# Patient Record
Sex: FEMALE | Race: WHITE | Employment: STUDENT | URBAN - METROPOLITAN AREA
[De-identification: names, ages, dates, MRNs, and addresses within clinical notes are randomized per-mention and may not be internally consistent; named-entity substitution may affect disease eponyms.]

---

## 2017-02-20 ENCOUNTER — HOSPITAL ENCOUNTER (EMERGENCY)
Age: 22
Discharge: HOME | End: 2017-02-20
Attending: EMERGENCY MEDICINE
Payer: COMMERCIAL

## 2017-02-20 VITALS
DIASTOLIC BLOOD PRESSURE: 111 MMHG | WEIGHT: 240 LBS | BODY MASS INDEX: 40.97 KG/M2 | OXYGEN SATURATION: 96 % | SYSTOLIC BLOOD PRESSURE: 166 MMHG | TEMPERATURE: 98.6 F | HEART RATE: 125 BPM | RESPIRATION RATE: 20 BRPM | HEIGHT: 64 IN

## 2017-02-20 DIAGNOSIS — J10.1 INFLUENZA A: Primary | ICD-10-CM

## 2017-02-20 LAB
RAPID INFLUENZA  B AGN: NEGATIVE
RAPID INFLUENZA A AGN: POSITIVE

## 2017-02-20 PROCEDURE — 99284 EMERGENCY DEPT VISIT MOD MDM: CPT

## 2017-02-20 PROCEDURE — 86403 PARTICLE AGGLUT ANTBDY SCRN: CPT

## 2017-02-20 PROCEDURE — 6360000002 HC RX W HCPCS: Performed by: EMERGENCY MEDICINE

## 2017-02-20 PROCEDURE — 94640 AIRWAY INHALATION TREATMENT: CPT

## 2017-02-20 RX ORDER — PREDNISONE 10 MG/1
60 TABLET ORAL DAILY
Qty: 30 TABLET | Refills: 0 | Status: SHIPPED | OUTPATIENT
Start: 2017-02-20 | End: 2017-02-25

## 2017-02-20 RX ORDER — OSELTAMIVIR PHOSPHATE 75 MG/1
75 CAPSULE ORAL 2 TIMES DAILY
Qty: 10 CAPSULE | Refills: 0 | Status: SHIPPED | OUTPATIENT
Start: 2017-02-20 | End: 2017-02-25

## 2017-02-20 RX ORDER — ALBUTEROL SULFATE 90 UG/1
AEROSOL, METERED RESPIRATORY (INHALATION)
Qty: 1 INHALER | Refills: 1 | Status: SHIPPED | OUTPATIENT
Start: 2017-02-20

## 2017-02-20 RX ORDER — ESTRADIOL AND NORETHINDRONE ACETATE .5; .1 MG/1; MG/1
1 TABLET ORAL DAILY
COMMUNITY

## 2017-02-20 RX ADMIN — IPRATROPIUM BROMIDE 0.5 MG: 0.5 SOLUTION RESPIRATORY (INHALATION) at 15:46

## 2017-02-20 RX ADMIN — ALBUTEROL SULFATE 5 MG: 2.5 SOLUTION RESPIRATORY (INHALATION) at 15:46

## 2017-02-20 ASSESSMENT — ENCOUNTER SYMPTOMS
EYE PAIN: 0
FACIAL SWELLING: 0
DIARRHEA: 0
COLOR CHANGE: 0
VOICE CHANGE: 0
RECTAL PAIN: 0
COUGH: 1
RHINORRHEA: 0
SINUS PRESSURE: 0
APNEA: 0
BACK PAIN: 0
ABDOMINAL PAIN: 0
ABDOMINAL DISTENTION: 0
TROUBLE SWALLOWING: 0
CHEST TIGHTNESS: 1
SORE THROAT: 0
EYE DISCHARGE: 0
VOMITING: 0
CHOKING: 0
WHEEZING: 1
CONSTIPATION: 0
STRIDOR: 0
BLOOD IN STOOL: 0
EYE ITCHING: 0
SHORTNESS OF BREATH: 0
ANAL BLEEDING: 0
PHOTOPHOBIA: 0
NAUSEA: 0
EYE REDNESS: 0

## 2017-09-06 ENCOUNTER — HOSPITAL ENCOUNTER (EMERGENCY)
Age: 22
Discharge: HOME | End: 2017-09-06
Attending: EMERGENCY MEDICINE
Payer: COMMERCIAL

## 2017-09-06 VITALS
BODY MASS INDEX: 40.97 KG/M2 | HEART RATE: 64 BPM | WEIGHT: 240 LBS | SYSTOLIC BLOOD PRESSURE: 164 MMHG | RESPIRATION RATE: 16 BRPM | HEIGHT: 64 IN | TEMPERATURE: 98.2 F | OXYGEN SATURATION: 98 % | DIASTOLIC BLOOD PRESSURE: 72 MMHG

## 2017-09-06 DIAGNOSIS — R42 DIZZINESS: Primary | ICD-10-CM

## 2017-09-06 LAB
BILIRUBIN URINE: NEGATIVE
BLOOD, URINE: NEGATIVE
CLARITY: CLEAR
COLOR: YELLOW
GLUCOSE URINE: NEGATIVE MG/DL
HCG(URINE) PREGNANCY TEST: NEGATIVE
KETONES, URINE: NORMAL MG/DL
LEUKOCYTE ESTERASE, URINE: NEGATIVE
NITRITE, URINE: NEGATIVE
PH UA: 7 (ref 5–9)
PROTEIN UA: NEGATIVE MG/DL
SPECIFIC GRAVITY UA: 1.02 (ref 1–1.03)
URINE REFLEX TO CULTURE: NORMAL
UROBILINOGEN, URINE: 0.2 E.U./DL

## 2017-09-06 PROCEDURE — 81003 URINALYSIS AUTO W/O SCOPE: CPT

## 2017-09-06 PROCEDURE — 99283 EMERGENCY DEPT VISIT LOW MDM: CPT

## 2017-09-06 PROCEDURE — 84703 CHORIONIC GONADOTROPIN ASSAY: CPT

## 2017-09-06 ASSESSMENT — ENCOUNTER SYMPTOMS
ABDOMINAL PAIN: 0
WHEEZING: 0
DIARRHEA: 0
NAUSEA: 0
TROUBLE SWALLOWING: 0
COUGH: 0
BLOOD IN STOOL: 0
EYE PAIN: 0
VOMITING: 0
CHEST TIGHTNESS: 0
RHINORRHEA: 0
BACK PAIN: 0
SHORTNESS OF BREATH: 0

## 2018-06-11 ENCOUNTER — OFFICE VISIT (OUTPATIENT)
Dept: UROLOGY | Facility: CLINIC | Age: 23
End: 2018-06-11
Payer: COMMERCIAL

## 2018-06-11 ENCOUNTER — HOSPITAL ENCOUNTER (OUTPATIENT)
Dept: RADIOLOGY | Facility: HOSPITAL | Age: 23
Discharge: HOME | End: 2018-06-11
Attending: NURSE PRACTITIONER
Payer: COMMERCIAL

## 2018-06-11 VITALS
HEIGHT: 65 IN | DIASTOLIC BLOOD PRESSURE: 86 MMHG | HEART RATE: 72 BPM | BODY MASS INDEX: 43.89 KG/M2 | SYSTOLIC BLOOD PRESSURE: 140 MMHG | WEIGHT: 263.44 LBS

## 2018-06-11 DIAGNOSIS — R10.9 FLANK PAIN: ICD-10-CM

## 2018-06-11 DIAGNOSIS — R10.9 ACUTE LEFT FLANK PAIN: Primary | ICD-10-CM

## 2018-06-11 DIAGNOSIS — R11.0 NAUSEA: ICD-10-CM

## 2018-06-11 DIAGNOSIS — Z87.442 PERSONAL HISTORY OF KIDNEY STONES: ICD-10-CM

## 2018-06-11 DIAGNOSIS — R10.9 ACUTE LEFT FLANK PAIN: ICD-10-CM

## 2018-06-11 LAB
BILIRUB SERPL-MCNC: NORMAL MG/DL
BLOOD URINE, POC: NORMAL
COLOR, POC UA: YELLOW
GLUCOSE UR QL STRIP: NORMAL
KETONES UR QL STRIP: NORMAL
LEUKOCYTE ESTERASE URINE, POC: NORMAL
NITRITE, POC UA: NORMAL
PH, POC UA: 5
PROTEIN, POC: NORMAL
SPECIFIC GRAVITY, POC UA: 1.02
UROBILINOGEN, POC UA: NORMAL

## 2018-06-11 PROCEDURE — 74018 RADEX ABDOMEN 1 VIEW: CPT | Mod: TC

## 2018-06-11 PROCEDURE — 74018 RADEX ABDOMEN 1 VIEW: CPT | Mod: 26,,, | Performed by: RADIOLOGY

## 2018-06-11 PROCEDURE — 81002 URINALYSIS NONAUTO W/O SCOPE: CPT | Mod: S$GLB,,, | Performed by: NURSE PRACTITIONER

## 2018-06-11 PROCEDURE — 99204 OFFICE O/P NEW MOD 45 MIN: CPT | Mod: 25,S$GLB,, | Performed by: NURSE PRACTITIONER

## 2018-06-11 PROCEDURE — 87086 URINE CULTURE/COLONY COUNT: CPT

## 2018-06-11 PROCEDURE — 99999 PR PBB SHADOW E&M-NEW PATIENT-LVL IV: CPT | Mod: PBBFAC,,, | Performed by: NURSE PRACTITIONER

## 2018-06-11 PROCEDURE — 3008F BODY MASS INDEX DOCD: CPT | Mod: CPTII,S$GLB,, | Performed by: NURSE PRACTITIONER

## 2018-06-11 RX ORDER — LEVONORGESTREL AND ETHINYL ESTRADIOL 0.15-0.03
1 KIT ORAL DAILY
COMMUNITY

## 2018-06-11 RX ORDER — KETOROLAC TROMETHAMINE 10 MG/1
10 TABLET, FILM COATED ORAL EVERY 6 HOURS PRN
Qty: 22 TABLET | Refills: 0 | Status: SHIPPED | OUTPATIENT
Start: 2018-06-11 | End: 2019-01-03

## 2018-06-11 RX ORDER — ONDANSETRON 8 MG/1
8 TABLET, ORALLY DISINTEGRATING ORAL EVERY 8 HOURS PRN
Qty: 22 TABLET | Refills: 0 | Status: SHIPPED | OUTPATIENT
Start: 2018-06-11 | End: 2019-01-03

## 2018-06-11 NOTE — PATIENT INSTRUCTIONS
Treating Kidney Stones: Ureteroscopic Stone Removal    Ureteroscopic stone removal may be done before, after, or instead of other treatments. If you need this procedure, your healthcare provider will discuss its risks and possible complications. You will be told how to prepare. And you will be told about anesthesia that will keep you pain-free during treatment.     A ureteroscope lets your doctor see your stone before removing it.   Removing the stone through the ureter  Ureteroscopic stone removal extracts a small stone in your ureter without an incision. Your doctor places a viewing tube (ureteroscope) in your ureter. A wire basket inserted through the tube removes the stone. Sometimes, a laser or a mechanical device is used to break up the stone. A soft tube may be left in your ureter briefly to drain urine.     The stone may be fragmented. The stone is then withdrawn or passed.   Your recovery  This is an outpatient or overnight procedure. For a few days after surgery, you may feel some pain when you urinate. Or you may need to urinate more often, or have bloody urine. You may have a ureteral stent. This is a soft tube that prevents blockage from swelling after the procedure. The stent is removed when the swelling goes down, often within days. Follow up as instructed to check for any new stones.  When to call your healthcare provider  Call your healthcare provider right away if:  · You have sudden pain or flank pain  · You have a fever over 100.4°F (38°C)  · You have nausea that lasts for days  · You have heavy bleeding when you urinate  · You have heavy bleeding through your drainage tube  · You have swelling or redness around your incision   Date Last Reviewed: 2/1/2017 © 2000-2017 [employer]. [address] [place] [address], [place] [address]. All rights reserved. This information is not intended as a substitute for professional medical care. Always follow your healthcare professional's  instructions.        Kidney Stone, Passed    A kidney stone (nephrolithiasis) starts as tiny crystals that form inside the kidney where urine is made. Most kidney stones enlarge to about 1/8 to 1/4 inch in size before leaving the kidney and moving toward the bladder. The sharp, cramping pain and nausea/vomiting you had was the stone moving through the ureter (the narrow tube joining the kidney to the bladder). Once the stone reaches your bladder, the pain stops. Pain may start again as the stone passes through the bladder and out through the urethra. There are 4 types of kidney stones. Eighty percent are calcium stones--mostly calcium oxalate but also some with calcium phosphate. The other 3 types include uric acid stones, struvite stones (from a preceding infection), and rarely, cystine stones.  Home care  The following guidelines will help you care for yourself at home:  · Drink plenty of fluids. This increases urine flow and reduces the chance that a new stone will form. Healthy adults (no heart/liver/kidney disease) who have had a kidney stone should drink 12, 8-ounce glasses of fluids per day. Most of this should be water. The goal is to produce 1.5 to 2 quarts of almost colorless urine per 24 hours.  · Unless another NSAID (non-steroidal anti-inflammatory drug) was given, you may take ibuprofen or naproxen in addition to any narcotic pain medicine your healthcare provider prescribes. If you have chronic liver or kidney disease or ever had a stomach ulcer or GI bleeding, talk with your healthcare provider before using these medicines.  · Collecting the stone: If you were given a strainer, urinate into a jar then pour the urine through the strainer and into the toilet. Keep doing this for 24 hours after your pain stops. Save any stone that you find in the strainer and bring it to your healthcare provider for analysis. If you do not collect a stone, a 24-hour urine specimen can be done at a later time by your  healthcare provider to figure out the cause of your stone.  Prevention  Each year, there is a chance that a new stone will form (50% chance over the next 5 to 7 years). The risk is highest if you have a family history of kidney stones or have certain chronic illnesses such as hypertension, obesity, or diabetes. However, there are lifestyle and dietary changes that you can make to reduce the risk of getting another kidney stone.  Most kidney stones are made of calcium. The following advice can help you prevent calcium stones. If you dont know the type of stone you have, follow this advice until the healthcare provider determines the cause of your stone.  Things that help:  · The most important thing you can do is to drink plenty of fluids each day, as described above.  · Certain foods, such as wheat, rice, rye, barley, and beans, contain phytate. Phytate is a compound that may lower the risk of recurrence of any type of stone.  · Eat more fruits and vegetables, especially those high in potassium.  · Eat foods high in natural citrate like fruit and fruit juices (using low sugar).  · Low calcium contributes to calcium type kidney stones. Eat a normal calcium diet and talk with your healthcare provider if you are taking calcium supplements. It may be detrimental to lower your calcium intake. New research shows that eating calcium-rich and oxalate-rich foods together lowers your risk of stones. This is because eating these foods together binds the minerals in the stomach and intestines before they can reach the kidneys.  · Limit salt intake to 2 grams (1 teaspoon) per day. Use limited amounts when cooking, and dont add salt at the table. Processed and canned foods are usually high in salt.  · Spinach, rhubarb, peanuts, cashews, almonds, grapefruit, and grapefruit juice are all high oxalate foods and should be reduced, or eaten with calcium-rich foods. These foods include dairy, dark leafy greens, soy products,  calcium-enriched foods, and others.  · Reduce the amount of animal meat and high protein foods in your diet. This may lower your risk of uric acid stones.  · Avoid excess sugar (sucrose) and fructose (sweetener in many soft drinks) in your diet.  · If you take vitamin C as a supplement, don't take more than 1,000 milligrams (mg) per day.  · A dietitian or your healthcare provider can give you ideas on how to change your diet to prevent more kidney stones.  Follow-up care  Follow up with your healthcare provider, or as advised. Even if you don't collect the kidney stone, it is possible to analyze a 24-hour urine collection for the cause of this stone. Discuss this with your healthcare provider.  If you had an X-ray, CT scan, or other diagnostic test, you will be told of any findings that may affect your care.  Call 911  Call 911 if you have any of these:  · Weakness, dizziness, or fainting  When to seek medical advice  Call your healthcare provider if any of the these occur:  · Severe pain that returns and not relieved by pain medicines  · Repeated vomiting or unable to keep down fluids  · Fever of 101.4ºF (38ºC) or higher, or as directed by your healthcare provider  · Blood clots in urine  · Foul smelling or cloudy urine  · Unable to pass urine for 8 hours or increasing bladder pressure  Date Last Reviewed: 10/1/2016  © 3486-8898 [employer]. [address] [place] [address], [place] [address]. All rights reserved. This information is not intended as a substitute for professional medical care. Always follow your healthcare professional's instructions.

## 2018-06-11 NOTE — PROGRESS NOTES
Subjective:       Patient ID: Ela Downs is a 22 y.o. female.    Chief Complaint: Other (left side pain. rates 5 on pain scale. hx of 8mm stone lithrotrypsy. stent for 2 weeks back in march. states pain feel similar to the pain in march)      HPI: Ela Downs is a 22 y.o. female who presents today for evaluation and management of possible kidney stones. She presents with her mother. This is her initial visit to the clinic.     The patient reports left upper abdominal pain. She describes it as dull with sharp stabbing at times. She rates it a 4/10- 9/10 at times. She states the pain is constant but at times increases. The pain has occurred for the last 3 days. She reports ibuprofen has helped to ease the pain. Nothing worsens the pain. Reports nausea. She denies f/c. She states that she had an 8 mm kidney stone in 3/2018 and was treated with a ureteroscopy and lithotripsy in [place]. The pain she is experiencing today is similar to the kidney stone pain before.     She reports she is urinating less. Denies dysuria and hematuria. The patient reports a good urinary stream and complete bladder emptying.    Stone Hx:  Ever seen a urologist before? yes  Previous stone episode? yes  Pass the stone? No   How did they treat it if not? Ureteroscopy and lithrotripsy  Type of stone? calcium  Family hx of stones? yes  Fluids? Water 2 liters daily  Occupation? Student     Review of patient's allergies indicates:  No Known Allergies    Current Outpatient Prescriptions   Medication Sig Dispense Refill    levonorgestrel-ethinyl estradiol (SEASONALE) 0.15 mg-30 mcg per tablet Take 1 tablet by mouth once daily.      ketorolac (TORADOL) 10 mg tablet Take 1 tablet (10 mg total) by mouth every 6 (six) hours as needed for Pain. 22 tablet 0    ondansetron (ZOFRAN-ODT) 8 MG TbDL Take 1 tablet (8 mg total) by mouth every 8 (eight) hours as needed. 22 tablet 0     No current facility-administered medications for this visit.         Past Medical History:   Diagnosis Date    Kidney stone        Past Surgical History:   Procedure Laterality Date    KIDNEY STONE SURGERY      LITHOTRIPSY         Family History   Problem Relation Age of Onset    Diabetes Paternal Grandmother     Heart disease Paternal Grandmother     Diabetes Paternal Grandfather     Diabetes Maternal Grandmother     Heart disease Maternal Grandmother     Diabetes Maternal Grandfather     Heart disease Maternal Grandfather          Review of Systems     Review of Systems   Constitutional: Negative for chills, fatigue and fever.   HENT: Negative for facial swelling.    Eyes: Negative for discharge.   Respiratory: Negative for chest tightness and shortness of breath.    Cardiovascular: Negative for leg swelling.   Gastrointestinal: Positive for abdominal pain. Negative for constipation, nausea and vomiting.   Genitourinary: Positive for decreased urine volume and flank pain. Negative for difficulty urinating, dysuria, enuresis, frequency, hematuria and urgency.   Musculoskeletal: Negative for gait problem.   Skin: Negative for rash.   Allergic/Immunologic: Negative for immunocompromised state.   Neurological: Negative for facial asymmetry.   Psychiatric/Behavioral: Negative for agitation and confusion.         Objective:     Vitals:    06/11/18 1108   BP: (!) 140/86   Pulse: 72     Physical Exam     Physical Exam   Nursing note and vitals reviewed.  Constitutional: She is oriented to person, place, and time. She appears well-developed and well-nourished.   HENT:   Head: Normocephalic.   Eyes: Conjunctivae are normal.   Neck: Neck supple.   Cardiovascular: Normal rate.    Pulmonary/Chest: Effort normal and breath sounds normal.   Abdominal: Soft. Bowel sounds are normal. She exhibits no distension and no mass. There is no tenderness. There is no rebound and no guarding.   NO CVA TENDERNESS   Genitourinary:   Genitourinary Comments: UA today showed + leuk and blood.     Musculoskeletal: Normal range of motion.   Neurological: She is alert and oriented to person, place, and time.   Skin: Skin is warm and dry.     Psychiatric: She has a normal mood and affect. Her behavior is normal.              Assessment:       1. Acute left flank pain    2. Personal history of kidney stones    3. Flank pain    4. Nausea        Plan:     Ela was seen today for other.    Diagnoses and all orders for this visit:    Acute left flank pain  -     POCT urine dipstick without microscope  -     X-Ray Abdomen AP 1 View; Future  -     CT Renal Stone Study ABD Pelvis WO; Future  -     Urine culture  -     ketorolac (TORADOL) 10 mg tablet; Take 1 tablet (10 mg total) by mouth every 6 (six) hours as needed for Pain.    Personal history of kidney stones  -     POCT urine dipstick without microscope  -     X-Ray Abdomen AP 1 View; Future  -     CT Renal Stone Study ABD Pelvis WO; Future  -     Urine culture  -     ketorolac (TORADOL) 10 mg tablet; Take 1 tablet (10 mg total) by mouth every 6 (six) hours as needed for Pain.    Flank pain  -     X-Ray Abdomen AP 1 View; Future  -     Urine culture  -     ketorolac (TORADOL) 10 mg tablet; Take 1 tablet (10 mg total) by mouth every 6 (six) hours as needed for Pain.    Nausea  -     ondansetron (ZOFRAN-ODT) 8 MG TbDL; Take 1 tablet (8 mg total) by mouth every 8 (eight) hours as needed.      Discussed kidney stones.  Diet modifications; educational materials.  Continue good water intake of at least 2.5 liters daily;   Strain all urine  KUB today.  CTRSS  Discussed 24 hour urine collection in the future  Discussed side effects, indications, and MOA for toradol and zofran. Prescription sent to the pharmacy. Pt verbalized understanding.    Get prompt medical attention if any of the following occur:  · Severe pain that returns and not relieved by pain medicines  · Repeated vomiting or unable to keep down fluids  · Weakness, dizziness or fainting  · Fever of 101.4ºF  (38ºC) or higher, or as directed by your healthcare provider  · Blood clots in urine  · Foul smelling or cloudy urine  · Unable to pass urine for 8 hours or increasing bladder pressure  ·   I encouraged her or any of her family members to call or email me with questions and/or concerns.  .

## 2018-06-12 ENCOUNTER — HOSPITAL ENCOUNTER (OUTPATIENT)
Dept: RADIOLOGY | Facility: HOSPITAL | Age: 23
Discharge: HOME | End: 2018-06-12
Attending: NURSE PRACTITIONER
Payer: COMMERCIAL

## 2018-06-12 DIAGNOSIS — Z87.442 PERSONAL HISTORY OF KIDNEY STONES: ICD-10-CM

## 2018-06-12 DIAGNOSIS — R10.9 ACUTE LEFT FLANK PAIN: ICD-10-CM

## 2018-06-12 LAB
BACTERIA UR CULT: NORMAL
BACTERIA UR CULT: NORMAL

## 2018-06-12 PROCEDURE — 74176 CT ABD & PELVIS W/O CONTRAST: CPT | Mod: 26,,, | Performed by: RADIOLOGY

## 2018-06-12 PROCEDURE — 74176 CT ABD & PELVIS W/O CONTRAST: CPT | Mod: TC

## 2018-06-13 DIAGNOSIS — K65.4 SCLEROSING MESENTERITIS: Primary | ICD-10-CM

## 2018-06-15 ENCOUNTER — OFFICE VISIT (OUTPATIENT)
Dept: GASTROENTEROLOGY | Facility: CLINIC | Age: 23
End: 2018-06-15
Payer: COMMERCIAL

## 2018-06-15 VITALS
WEIGHT: 265.44 LBS | DIASTOLIC BLOOD PRESSURE: 89 MMHG | SYSTOLIC BLOOD PRESSURE: 143 MMHG | HEIGHT: 65 IN | HEART RATE: 93 BPM | BODY MASS INDEX: 44.22 KG/M2

## 2018-06-15 DIAGNOSIS — R10.12 LUQ ABDOMINAL PAIN: Primary | ICD-10-CM

## 2018-06-15 PROCEDURE — 99204 OFFICE O/P NEW MOD 45 MIN: CPT | Mod: S$GLB,,, | Performed by: INTERNAL MEDICINE

## 2018-06-15 PROCEDURE — 3008F BODY MASS INDEX DOCD: CPT | Mod: CPTII,S$GLB,, | Performed by: INTERNAL MEDICINE

## 2018-06-15 PROCEDURE — 99999 PR PBB SHADOW E&M-EST. PATIENT-LVL III: CPT | Mod: PBBFAC,,, | Performed by: INTERNAL MEDICINE

## 2018-06-15 NOTE — PROGRESS NOTES
REASON FOR VISIT:  Left upper quadrant abdominal pain.    HISTORY OF PRESENT ILLNESS:  Ms. Ela Downs is a 22-year-old with history   of kidney stones diagnosed in [place] who presented with a similar episode less   than a week ago and on CT scan, no stones were found; however, there was some   suspicion of possible chronic mesenteritis, although upon review of the CT, it   does not look like classic mesenteritis.  In any case, her pain has improved,   denies any further pain, just dull aching in that region.  She denies any   history of trauma or radiation.  She did undergo removal of ureteral stone with   ureteral stent placement in March 2018.  Today, we discussed about proceeding   with a CT scan with oral and IV contrast to be done sometime in October when she   comes down to [place] from [place] for a break.  This will be to reassess her   mesentery to evaluate for chronic mesenteritis.  I did discuss with her and her   mom that the likelihood of chronic mesenteritis is very small, especially with   this being a recommendation and their age group not being very likely to be   affected.    PAST MEDICAL, SURGICAL, SOCIAL AND FAMILY HISTORY:  Reviewed.    MEDICATIONS AND ALLERGIES:  Reviewed.    REVIEW OF SYSTEMS:  CONSTITUTIONAL:  No fever.  No chills.  No weight loss.  Appetite is normal.  EYES:  No visual changes.  No red eyes.  ENT:  No odynophagia or hoarseness of voice.  CARDIOVASCULAR:  No angina or palpitations.  RESPIRATORY:  No shortness of breath or wheezing.  GENITOURINARY:  No dysuria or frequency.  MUSCULOSKELETAL:  No myalgia or arthralgia.  SKIN:  No pruritus or eczema.  NEUROLOGIC:  No headache.  No seizures.  PSYCHIATRIC:  No anxiety or depression.  GASTROINTESTINAL:  See HPI.    PHYSICAL EXAMINATION:  VITAL SIGNS:  See EPIC.  GENERAL:  Awake, alert and oriented x3, in no acute distress.  NECK:  Supple.  No carotid bruit.  No cervical adenopathy.  ABDOMEN:  Obese, soft and nondistended.   Mild tenderness to deep palpation on   the left side.  No guarding or rigidity.  Bowel sounds are normal.  No abdominal   bruits heard.  EYES:  Conjunctivae are anicteric.  ENT:  Oropharynx, mucosa moist.  CARDIOVASCULAR:  S1 and S2 normal.  RESPIRATORY:  Bilateral air entry equal.  SKIN:  No palmar erythema or spider angiomata.  NEUROLOGIC:  No asterixis.  PSYCHIATRY:  Affect is appropriate.  LOWER EXTREMITY:  No pedal edema.    IMPRESSION:  Recent episode of left quadrant abdominal pain with CT scan not   classic for chronic mesenteritis, but there was mention of some lymph nodes ____   the mesentery.    RECOMMENDATIONS:  We will repeat a CT scan in October 2018, when Ms. Downs   returns to [place] with p.o. and IV contrast to reevaluate.  Until then, the   patient will continue to monitor her symptoms and report if her symptoms   worsen.      ACT/IN  dd: 06/15/2018 15:50:43 (CDT)  td: 06/16/2018 05:32:26 (CDT)  Doc ID   #[id number]  Job ID #[id number]    CC:

## 2018-06-15 NOTE — LETTER
Teresa 15, 2018      Shayla Krause, NP  [address] [facility] [address]           [facility] - Gastroenterology  [address]  [place] [address]  Phone: [phone]  Fax: [phone]          Patient: Ela Downs   MR Number: [id number]   YOB: 1995   Date of Visit: 6/15/2018       Dear Shayla Krause:    Thank you for referring Ela Downs to me for evaluation. Attached you will find relevant portions of my assessment and plan of care.    If you have questions, please do not hesitate to call me. I look forward to following Ela Downs along with you.    Sincerely,    Kendall Perez MD    Enclosure  CC:  No Recipients    If you would like to receive this communication electronically, please contact [email or web address] or [phone] to request more information on [employer] access.    For providers and/or their staff who would like to refer a patient to [facility], please contact us through our one-stop-shop provider referral line, [facility], at [phone].    If you feel you have received this communication in error or would no longer like to receive these types of communications, please e-mail [email or web address]

## 2018-12-12 ENCOUNTER — OFFICE VISIT (OUTPATIENT)
Dept: FAMILY MEDICINE CLINIC | Age: 23
End: 2018-12-12
Payer: COMMERCIAL

## 2018-12-12 VITALS
SYSTOLIC BLOOD PRESSURE: 126 MMHG | HEIGHT: 64 IN | BODY MASS INDEX: 43.36 KG/M2 | TEMPERATURE: 97.1 F | DIASTOLIC BLOOD PRESSURE: 80 MMHG | HEART RATE: 90 BPM | RESPIRATION RATE: 98 BRPM | WEIGHT: 254 LBS

## 2018-12-12 DIAGNOSIS — J20.9 ACUTE BRONCHITIS, UNSPECIFIED ORGANISM: Primary | ICD-10-CM

## 2018-12-12 PROCEDURE — 99203 OFFICE O/P NEW LOW 30 MIN: CPT | Performed by: NURSE PRACTITIONER

## 2018-12-12 PROCEDURE — G8427 DOCREV CUR MEDS BY ELIG CLIN: HCPCS | Performed by: NURSE PRACTITIONER

## 2018-12-12 PROCEDURE — G8417 CALC BMI ABV UP PARAM F/U: HCPCS | Performed by: NURSE PRACTITIONER

## 2018-12-12 PROCEDURE — G8484 FLU IMMUNIZE NO ADMIN: HCPCS | Performed by: NURSE PRACTITIONER

## 2018-12-12 PROCEDURE — 1036F TOBACCO NON-USER: CPT | Performed by: NURSE PRACTITIONER

## 2018-12-12 RX ORDER — KETOROLAC TROMETHAMINE 10 MG/1
10 TABLET, FILM COATED ORAL
COMMUNITY
Start: 2018-06-11

## 2018-12-12 RX ORDER — BENZONATATE 100 MG/1
100 CAPSULE ORAL 3 TIMES DAILY PRN
Qty: 21 CAPSULE | Refills: 0 | Status: SHIPPED | OUTPATIENT
Start: 2018-12-12 | End: 2018-12-19

## 2018-12-12 RX ORDER — ALPRAZOLAM 0.25 MG/1
TABLET ORAL
Refills: 0 | COMMUNITY
Start: 2018-12-02

## 2018-12-12 RX ORDER — METHYLPREDNISOLONE 4 MG/1
TABLET ORAL
Qty: 1 KIT | Refills: 0 | Status: SHIPPED | OUTPATIENT
Start: 2018-12-12

## 2018-12-12 RX ORDER — DEXTROMETHORPHAN HYDROBROMIDE AND PROMETHAZINE HYDROCHLORIDE 15; 6.25 MG/5ML; MG/5ML
5 SYRUP ORAL 4 TIMES DAILY PRN
Qty: 180 ML | Refills: 0 | Status: SHIPPED | OUTPATIENT
Start: 2018-12-12 | End: 2018-12-19

## 2018-12-12 ASSESSMENT — ENCOUNTER SYMPTOMS
TROUBLE SWALLOWING: 0
EYE DISCHARGE: 0
EYE REDNESS: 0
SORE THROAT: 0
RHINORRHEA: 0
SINUS PAIN: 0
NAUSEA: 1
WHEEZING: 0
EYE ITCHING: 0
SINUS PRESSURE: 0
DIARRHEA: 0
EYE PAIN: 0
VOMITING: 0
COUGH: 0
SWOLLEN GLANDS: 0
ABDOMINAL PAIN: 0
PHOTOPHOBIA: 0
SHORTNESS OF BREATH: 0
CHEST TIGHTNESS: 0

## 2018-12-12 ASSESSMENT — PATIENT HEALTH QUESTIONNAIRE - PHQ9
SUM OF ALL RESPONSES TO PHQ QUESTIONS 1-9: 0
SUM OF ALL RESPONSES TO PHQ QUESTIONS 1-9: 0
2. FEELING DOWN, DEPRESSED OR HOPELESS: 0
SUM OF ALL RESPONSES TO PHQ9 QUESTIONS 1 & 2: 0
1. LITTLE INTEREST OR PLEASURE IN DOING THINGS: 0

## 2018-12-12 NOTE — PROGRESS NOTES
mouth 2 times daily (with meals)      albuterol sulfate HFA (PROAIR HFA) 108 (90 BASE) MCG/ACT inhaler Use every 4 hours while awake for 7-10 days then PRN wheezing  Dispense with SPACER and Instruct on use. May sub Ventolin or Proventil as needed per [employer]. 1 Inhaler 1     No current facility-administered medications for this visit. Review of Systems   Constitutional: Positive for diaphoresis and fatigue. Negative for activity change, appetite change, chills and fever. HENT: Positive for congestion (improves throughout the day). Negative for ear discharge, ear pain, postnasal drip, rhinorrhea, sinus pain, sinus pressure, sneezing, sore throat, tinnitus and trouble swallowing. Eyes: Negative for photophobia, pain, discharge, redness and itching. Respiratory: Negative for cough, chest tightness, shortness of breath and wheezing. Cardiovascular: Negative for chest pain. Gastrointestinal: Positive for nausea. Negative for abdominal pain, diarrhea and vomiting. Genitourinary: Negative for dysuria. Musculoskeletal: Negative for arthralgias, joint pain and neck pain. Skin: Negative for rash. Allergic/Immunologic: Negative for environmental allergies. Neurological: Positive for headaches. Hematological: Negative for adenopathy. Objective    Vitals:    12/12/18 1632   BP: 126/80   Site: Right Upper Arm   Position: Sitting   Cuff Size: Large Adult   Pulse: 90   Resp: (!) 98   Temp: 97.1 °F (36.2 °C)   TempSrc: Temporal   Weight: 254 lb (115.2 kg)   Height: 5' 4\" (1.626 m)       Physical Exam   Constitutional: She is oriented to person, place, and time. Vital signs are normal. She appears well-developed and well-nourished. She is cooperative. She does not have a sickly appearance. No distress. HENT:   Head: Normocephalic and atraumatic. Right Ear: Tympanic membrane and external ear normal. Tympanic membrane is not bulging. No middle ear effusion.    Left Ear: Tympanic membrane and

## 2018-12-12 NOTE — PATIENT INSTRUCTIONS
good.  When should you call for help? Call 911 anytime you think you may need emergency care. For example, call if:    · You have severe trouble breathing.    Call your doctor now or seek immediate medical care if:    · You have new or worse trouble breathing.     · You cough up dark brown or bloody mucus (sputum).     · You have a new or higher fever.     · You have a new rash.    Watch closely for changes in your health, and be sure to contact your doctor if:    · You cough more deeply or more often, especially if you notice more mucus or a change in the color of your mucus.     · You are not getting better as expected. Where can you learn more? Go to [email or web address] org and sign in to your [employer] account. Enter H333 in the [employer] box to learn more about \"Bronchitis: Care Instructions. \"     If you do not have an account, please click on the \"Sign Up Now\" link. Current as of: December 6, 2017  Content Version: 11.8  © 8030-7490 [employer]. Care instructions adapted under license by [facility] ([facility]). If you have questions about a medical condition or this instruction, always ask your healthcare professional. [place] [address] any warranty or liability for your use of this information.

## 2018-12-31 PROBLEM — N20.0 KIDNEY STONE: Status: ACTIVE | Noted: 2018-03-06

## 2018-12-31 PROBLEM — F41.1 GAD (GENERALIZED ANXIETY DISORDER): Status: ACTIVE | Noted: 2017-09-13

## 2018-12-31 PROBLEM — N13.70: Status: ACTIVE | Noted: 2018-03-06

## 2019-01-03 RX ORDER — TAMSULOSIN HYDROCHLORIDE 0.4 MG/1
0.4 CAPSULE ORAL
COMMUNITY
Start: 2018-03-13 | End: 2019-01-04

## 2019-01-03 RX ORDER — METFORMIN HYDROCHLORIDE 500 MG/1
1000 TABLET ORAL
COMMUNITY
Start: 2016-07-06 | End: 2019-01-04

## 2019-01-03 RX ORDER — ALBUTEROL SULFATE 90 UG/1
AEROSOL, METERED RESPIRATORY (INHALATION)
COMMUNITY
Start: 2017-02-20 | End: 2019-01-04

## 2019-01-03 RX ORDER — ALPRAZOLAM 0.25 MG/1
0.25 TABLET ORAL
COMMUNITY
Start: 2018-11-27 | End: 2019-05-26

## 2019-01-03 RX ORDER — ESTRADIOL AND NORETHINDRONE ACETATE .5; .1 MG/1; MG/1
1 TABLET ORAL
COMMUNITY
End: 2019-01-04

## 2019-01-03 RX ORDER — OXYBUTYNIN CHLORIDE 5 MG/1
5 TABLET ORAL
COMMUNITY
Start: 2018-03-13 | End: 2019-01-04

## 2019-01-03 RX ORDER — IBUPROFEN 100 MG/5ML
1000 SUSPENSION, ORAL (FINAL DOSE FORM) ORAL
COMMUNITY
End: 2019-01-04

## 2019-01-03 NOTE — PROGRESS NOTES
Subjective:       Patient ID: Ela Downs is a 23 y.o. female.    Chief Complaint: Chest Pain and Dizziness    Pt new to me, here for chest pain and fatigue. Has been going on since October off and on. She has been out of state in [place] for school. PCP Dr. Paniagua in [place].    States she has it off and on, it comes and goes. Denies chest pain on exertion. States exercise helps it. Only lasts about 30 seconds and goes away then comes back. Denies WADE, SOB. Denies palpitations. Reports light headedness when it happens. Reports anxiety and stress. She is a senior in college, going to grad school. She has a hx of ELIEZER. She states when she goes back to [place] next week she plans on seeing a psychiatrist. State last episode was 2 days ago when in the grocery store. States she has a bad relationship with food so she thinks that may be it. Denies social phobias, she is a tabares. States she was on something daily for anxiety, however it dried her out and she is a tabares, so she was taken off, unsure of what the med was. She only gets 10 Xanax's a year and uses rarely. Reports occasional reflux only if she eats at night before going to bed. Denies N/V/D.      Review of Systems   Constitutional: Negative for activity change, chills, diaphoresis and fever.   Eyes: Negative for visual disturbance.   Respiratory: Negative for apnea, cough, chest tightness, shortness of breath and wheezing.    Cardiovascular: Positive for chest pain. Negative for palpitations and leg swelling.        As documented in HPI     Gastrointestinal: Negative for abdominal pain, constipation, diarrhea, nausea and vomiting.   Endocrine: Negative for cold intolerance, heat intolerance, polydipsia, polyphagia and polyuria.   Genitourinary: Negative for dysuria.   Musculoskeletal: Negative for arthralgias, myalgias, neck pain and neck stiffness.   Skin: Negative for color change, pallor and rash.   Allergic/Immunologic: Negative for environmental  allergies, food allergies and immunocompromised state.   Neurological: Positive for dizziness and light-headedness. Negative for tremors, seizures, syncope, speech difficulty, weakness, numbness and headaches.        As documented in HPI     Hematological: Negative for adenopathy. Does not bruise/bleed easily.   Psychiatric/Behavioral: Negative for agitation, dysphoric mood, self-injury, sleep disturbance and suicidal ideas. The patient is nervous/anxious.         As documented in HPI           Review of patient's allergies indicates:  No Known Allergies    Current Outpatient Medications:     ALPRAZolam (XANAX) 0.25 MG tablet, Take 0.25 mg by mouth., Disp: , Rfl:     levonorgestrel-ethinyl estradiol (SEASONALE) 0.15 mg-30 mcg per tablet, Take 1 tablet by mouth once daily., Disp: , Rfl:     Patient Active Problem List   Diagnosis    Metabolic syndrome    ELIEZER (generalized anxiety disorder)    PCOS (polycystic ovarian syndrome)    Reflux of urine    Kidney stone     Past Medical History:   Diagnosis Date    Kidney stone     Kidney stone 3/6/2018    PCOS (polycystic ovarian syndrome) 12/21/2015     Past Surgical History:   Procedure Laterality Date    KIDNEY STONE SURGERY      LITHOTRIPSY       Social History     Socioeconomic History    Marital status: Single     Spouse name: None    Number of children: None    Years of education: None    Highest education level: None   Social Needs    Financial resource strain: None    Food insecurity - worry: None    Food insecurity - inability: None    Transportation needs - medical: None    Transportation needs - non-medical: None   Occupational History    None   Tobacco Use    Smoking status: Never Smoker    Smokeless tobacco: Never Used   Substance and Sexual Activity    Alcohol use: None     Comment: once monthly    Drug use: No    Sexual activity: None   Other Topics Concern    None   Social History Narrative    None     Family History   Problem  "Relation Age of Onset    Diabetes Paternal Grandmother     Heart disease Paternal Grandmother     Diabetes Paternal Grandfather     Diabetes Maternal Grandmother     Heart disease Maternal Grandmother     Diabetes Maternal Grandfather     Heart disease Maternal Grandfather     Colon cancer Neg Hx     Esophageal cancer Neg Hx        Objective:       Vitals:    01/04/19 0806   BP: 124/68   Pulse: 75   SpO2: 97%   Weight: 116.9 kg (257 lb 11.5 oz)   Height: 5' 4.5" (1.638 m)   PainSc: 0-No pain       Body mass index is 43.55 kg/m².    Physical Exam   Constitutional: She is oriented to person, place, and time. Vital signs are normal. She appears well-developed and well-nourished.   Morbidly obese   Eyes: Conjunctivae, EOM and lids are normal. Pupils are equal, round, and reactive to light. Lids are everted and swept, no foreign bodies found.   Neck: Trachea normal, normal range of motion and full passive range of motion without pain. Neck supple. No JVD present. Carotid bruit is not present.   Cardiovascular: Normal rate, regular rhythm, S1 normal, S2 normal, normal heart sounds, intact distal pulses and normal pulses.   Pulmonary/Chest: Effort normal and breath sounds normal.   Abdominal: Soft. Normal appearance and bowel sounds are normal. There is no hepatosplenomegaly.   obese   Musculoskeletal: Normal range of motion.   Neurological: She is alert and oriented to person, place, and time. She has normal strength and normal reflexes.   Skin: Skin is warm, dry and intact. Capillary refill takes less than 2 seconds.   Psychiatric: She has a normal mood and affect. Her speech is normal and behavior is normal. Judgment and thought content normal. Cognition and memory are normal.   Nursing note and vitals reviewed.      AKG sinus bradycardia, otherwise normal EKG, no ST changes  Assessment:       1. ELIEZER (generalized anxiety disorder)    2. Fatigue, unspecified type    3. Chest pain, unspecified type    4. " Dizziness    5. BMI 40.0-44.9, adult    6. Morbid obesity with BMI of 40.0-44.9, adult    7. Chest pain at rest        Plan:       Ela was seen today for chest pain and dizziness.    Diagnoses and all orders for this visit:    ELIEZER (generalized anxiety disorder)  Pt will see psych next week when she goes back to [place]    No SI or HI reported today    Fatigue, unspecified type  -     EKG 12-lead    Chest pain, unspecified type  -     EKG 12-lead    Dizziness  -     EKG 12-lead    BMI 40.0-44.9, adult  BMI reviewed    Morbid obesity with BMI of 40.0-44.9, adult  BMI reviewed.    Diet and exercise to lose weight.    Chest pain at rest  -     EKG 12-leadChecked EKG today    Checked EKG today, normal sinus bradycardia(just slow HR of 57, not concerning), no ST ischemic changes, so this is not cardiac    Advised when she goes back to [place] to see Psychiatrist and therapist as this is most likely anxiety as she has had this before    If any SOB, worsening Chest pain, increased dizziness, numbness, or tingling, or symptoms worsen, call 911 and go to ER    Follow-up with MD in [place] for psych and therapy consult next week.

## 2019-01-04 ENCOUNTER — OFFICE VISIT (OUTPATIENT)
Dept: INTERNAL MEDICINE | Facility: CLINIC | Age: 24
End: 2019-01-04
Payer: COMMERCIAL

## 2019-01-04 VITALS
HEIGHT: 65 IN | OXYGEN SATURATION: 97 % | SYSTOLIC BLOOD PRESSURE: 124 MMHG | HEART RATE: 75 BPM | WEIGHT: 257.69 LBS | DIASTOLIC BLOOD PRESSURE: 68 MMHG | BODY MASS INDEX: 42.93 KG/M2

## 2019-01-04 DIAGNOSIS — E66.01 MORBID OBESITY WITH BMI OF 40.0-44.9, ADULT: ICD-10-CM

## 2019-01-04 DIAGNOSIS — R53.83 FATIGUE, UNSPECIFIED TYPE: ICD-10-CM

## 2019-01-04 DIAGNOSIS — R07.9 CHEST PAIN, UNSPECIFIED TYPE: ICD-10-CM

## 2019-01-04 DIAGNOSIS — F41.1 GAD (GENERALIZED ANXIETY DISORDER): Primary | ICD-10-CM

## 2019-01-04 DIAGNOSIS — R42 DIZZINESS: ICD-10-CM

## 2019-01-04 DIAGNOSIS — R07.9 CHEST PAIN AT REST: ICD-10-CM

## 2019-01-04 PROCEDURE — 3008F PR BODY MASS INDEX (BMI) DOCUMENTED: ICD-10-PCS | Mod: CPTII,S$GLB,, | Performed by: NURSE PRACTITIONER

## 2019-01-04 PROCEDURE — 99203 PR OFFICE/OUTPT VISIT, NEW, LEVL III, 30-44 MIN: ICD-10-PCS | Mod: S$GLB,,, | Performed by: NURSE PRACTITIONER

## 2019-01-04 PROCEDURE — 99999 PR PBB SHADOW E&M-EST. PATIENT-LVL III: ICD-10-PCS | Mod: PBBFAC,,, | Performed by: NURSE PRACTITIONER

## 2019-01-04 PROCEDURE — 99203 OFFICE O/P NEW LOW 30 MIN: CPT | Mod: S$GLB,,, | Performed by: NURSE PRACTITIONER

## 2019-01-04 PROCEDURE — 93010 EKG 12-LEAD: ICD-10-PCS | Mod: S$GLB,,, | Performed by: INTERNAL MEDICINE

## 2019-01-04 PROCEDURE — 93005 EKG 12-LEAD: ICD-10-PCS | Mod: S$GLB,,, | Performed by: NURSE PRACTITIONER

## 2019-01-04 PROCEDURE — 99999 PR PBB SHADOW E&M-EST. PATIENT-LVL III: CPT | Mod: PBBFAC,,, | Performed by: NURSE PRACTITIONER

## 2019-01-04 PROCEDURE — 93005 ELECTROCARDIOGRAM TRACING: CPT | Mod: S$GLB,,, | Performed by: NURSE PRACTITIONER

## 2019-01-04 PROCEDURE — 93010 ELECTROCARDIOGRAM REPORT: CPT | Mod: S$GLB,,, | Performed by: INTERNAL MEDICINE

## 2019-01-04 PROCEDURE — 3008F BODY MASS INDEX DOCD: CPT | Mod: CPTII,S$GLB,, | Performed by: NURSE PRACTITIONER

## 2019-01-04 NOTE — PATIENT INSTRUCTIONS
Checked EKG today, normal sinus bradycardia(just slow HR of 57, not concerning), no ST ischemic changes, so this is not cardiac    Advised when she goes back to [place] to see Psychiatrist and therapist as this is most likely anxiety as she has had this before    If any SOB, worsening Chest pain, increased dizziness, numbness, or tingling, or symptoms worsen, call 911 and go to ER

## 2019-11-22 ENCOUNTER — OFFICE VISIT (OUTPATIENT)
Dept: URGENT CARE | Facility: CLINIC | Age: 24
End: 2019-11-22
Payer: COMMERCIAL

## 2019-11-22 VITALS
DIASTOLIC BLOOD PRESSURE: 90 MMHG | RESPIRATION RATE: 18 BRPM | TEMPERATURE: 98 F | OXYGEN SATURATION: 99 % | HEIGHT: 65 IN | BODY MASS INDEX: 42.93 KG/M2 | HEART RATE: 90 BPM | WEIGHT: 257.69 LBS | SYSTOLIC BLOOD PRESSURE: 163 MMHG

## 2019-11-22 DIAGNOSIS — J06.9 VIRAL UPPER RESPIRATORY INFECTION: Primary | ICD-10-CM

## 2019-11-22 PROCEDURE — 3008F BODY MASS INDEX DOCD: CPT | Mod: CPTII,S$GLB,, | Performed by: PHYSICIAN ASSISTANT

## 2019-11-22 PROCEDURE — 99203 PR OFFICE/OUTPT VISIT, NEW, LEVL III, 30-44 MIN: ICD-10-PCS | Mod: S$GLB,,, | Performed by: PHYSICIAN ASSISTANT

## 2019-11-22 PROCEDURE — 99203 OFFICE O/P NEW LOW 30 MIN: CPT | Mod: S$GLB,,, | Performed by: PHYSICIAN ASSISTANT

## 2019-11-22 PROCEDURE — 3008F PR BODY MASS INDEX (BMI) DOCUMENTED: ICD-10-PCS | Mod: CPTII,S$GLB,, | Performed by: PHYSICIAN ASSISTANT

## 2019-11-22 RX ORDER — PREDNISONE 20 MG/1
TABLET ORAL
Qty: 6 TABLET | Refills: 0 | Status: SHIPPED | OUTPATIENT
Start: 2019-11-22 | End: 2019-11-26

## 2019-11-22 NOTE — PATIENT INSTRUCTIONS
- Rest.    - Drink plenty of fluids.      - Viral upper respiratory infections typically run their course in 10-14 days.     - Tylenol or Ibuprofen as directed as needed for fever/pain. Avoid tylenol if you have a history of liver disease. Do not take ibuprofen if you have a history of GI bleeding, kidney disease, or if you take blood thinners.     - You can take over-the-counter claritin, zyrtec, allegra, or xyzal as directed. These are antihistamines that can help with runny nose, nasal congestion, sneezing, and helps to dry up post-nasal drip, which usually causes sore throat and cough.   - If you do NOT have high blood pressure, you may use a decongestant form (D)  of this medication or if you do not take the D form, you can take sudafed  (pseudoephedrine) over the counter, which is a decongestant.    - You can use Flonase (fluticasone) nasal spray as directed for sinus congestion and postnasal drip. This is a steroid nasal spray that works locally over time to decrease the inflammation in your nose/sinuses and help with allergic symptoms. This is not an quick- relief spray like afrin, but it works well if used daily.  Discontinue if you develop nose bleed  - use nasal saline prior to Flonase.    - Use Ocean Spray Nasal Saline 1-3 puffs each nostril every 2-3 hours then blow out onto tissue. This is to irrigate the nasal passage way to clear the sinus openings. Use until sinus problem resolved.      - you can take plain Mucinex (guaifenesin) 1200 mg twice a day to help loosen mucous    -warm salt water gargles can help with sore throat    - warm tea with honey can help with cough. Honey is a natural cough suppressant.    - You received a steroid (prednisone) today.  This can elevate your blood pressure, elevate your blood sugar, water weight gain, nervous energy, redness to the face and dimpling of the skin where the shot goes in.   - Do not use steroids more than 3 times per year.   - If you have diabetes,  please check you blood sugar frequently.  - If you have high blood pressure, please check your blood pressure frequently.     - Follow up with your PCP or specialty clinic as directed in the next 1-2 weeks if not improved or as needed.  You can call [phone] to schedule an appointment with the appropriate provider.      - Go to the ER if you develop new or worsening symptoms.     - You must understand that you have received an Urgent Care treatment only and that you may be released before all of your medical problems are known or treated.   - You, the patient, will arrange for follow up care as instructed.   - If your condition worsens or fails to improve we recommend that you receive another evaluation at the ER immediately or contact your PCP to discuss your concerns or return here.     Elevated Blood Pressure  Your blood pressure was elevated during your visit to the urgent care.  It was not so high that immediate care was needed but it is recommended that you monitor your blood pressure over the next week or two to make sure that it is not staying elevated.  Please have your blood pressure taken 2-3 times daily at different times of the day.  Write all of those blood pressures down and record the time that they were taken.  Keep all that information and take it with you to see your Primary Care Physician.  If your blood pressure is consistently above 140/90 you will need to follow up with your PCP more quickly        Viral Upper Respiratory Illness (Adult)  You have a viral upper respiratory illness (URI), which is another term for the common cold. This illness is contagious during the first few days. It is spread through the air by coughing and sneezing. It may also be spread by direct contact (touching the sick person and then touching your own eyes, nose, or mouth). Frequent handwashing will decrease risk of spread. Most viral illnesses go away within 7 to 10 days with rest and simple home remedies.  Sometimes the illness may last for several weeks. Antibiotics will not kill a virus, and they are generally not prescribed for this condition.    Home care  · If symptoms are severe, rest at home for the first 2 to 3 days. When you resume activity, don't let yourself get too tired.  · Avoid being exposed to cigarette smoke (yours or others).  · You may use acetaminophen or ibuprofen to control pain and fever, unless another medicine was prescribed. (Note: If you have chronic liver or kidney disease, have ever had a stomach ulcer or gastrointestinal bleeding, or are taking blood-thinning medicines, talk with your healthcare provider before using these medicines.) Aspirin should never be given to anyone under 18 years of age who is ill with a viral infection or fever. It may cause severe liver or brain damage.  · Your appetite may be poor, so a light diet is fine. Avoid dehydration by drinking 6 to 8 glasses of fluids per day (water, soft drinks, juices, tea, or soup). Extra fluids will help loosen secretions in the nose and lungs.  · Over-the-counter cold medicines will not shorten the length of time youre sick, but they may be helpful for the following symptoms: cough, sore throat, and nasal and sinus congestion. (Note: Do not use decongestants if you have high blood pressure.)  Follow-up care  Follow up with your healthcare provider, or as advised.  When to seek medical advice  Call your healthcare provider right away if any of these occur:  · Cough with lots of colored sputum (mucus)  · Severe headache; face, neck, or ear pain  · Difficulty swallowing due to throat pain  · Fever of 100.4°F (38°C)  Call 911, or get immediate medical care  Call emergency services right away if any of these occur:  · Chest pain, shortness of breath, wheezing, or difficulty breathing  · Coughing up blood  · Inability to swallow due to throat pain  Date Last Reviewed: 9/13/2015  © 9091-0023 [employer]. [address] [place] [address]  [place] [address]. All rights reserved. This information is not intended as a substitute for professional medical care. Always follow your healthcare professional's instructions.        Earache, No Infection (Adult)  Earaches can happen without an infection. This occurs when air and fluid build up behind the eardrum causing a feeling of fullness and discomfort and reduced hearing. This is called otitis media with effusion (OME) or serous otitis media. It means there is fluid in the middle ear. It is not the same as acute otitis media, which is typically from infection.  OME can happen when you have a cold if congestion blocks the passage that drains the middle ear. This passage is called the eustachian tube. OME may also occur with nasal allergies or after a bacterial middle ear infection.    The pain or discomfort may come and go. You may hear clicking or popping sounds when you chew or swallow. You may feel that your balance is off. Or you may hear ringing in the ear.  It often takes from several weeks up to 3 months for the fluid to clear on its own. Oral pain relievers and ear drops help if there is pain. Decongestants and antihistamines sometimes help. Antibiotics don't help since there is no infection. Your doctor may prescribe a nasal spray to help reduce swelling in the nose and eustachian tube. This can allow the ear to drain.  If your OME doesn't improve after 3 months, surgery may be used to drain the fluid and insert a small tube in the eardrum to allow continued drainage.  Because the middle ear fluid can become infected, it is important to watch for signs of an ear infection which may develop later. These signs include increased ear pain, fever, or drainage from the ear.  Home care  The following guidelines will help you care for yourself at home:  · You may use over-the-counter medicine as directed to control pain, unless another medicine was prescribed. If you have chronic liver or kidney  disease or ever had a stomach ulcer or GI bleeding, talk with your doctor before using these medicines. Aspirin should never be used in anyone under 18 years of age who is ill with a fever. It may cause severe liver damage.  · You may use over-the-counter decongestants such as phenylephrine or pseudoephedrine. But they are not always helpful. Don't use nasal spray decongestants more than 3 days. Longer use can make congestion worse. Prescription nasal sprays from your doctor don't typically have those restrictions.  · Antihistamines may help if you are also having allergy symptoms.  · You may use medicines such as guaifenesin to thin mucus and promote drainage.  Follow-up care  Follow up with your healthcare provider or as advised if you are not feeling better after 3 days.  When to seek medical advice  Call your healthcare provider right away if any of the following occur:  · Your ear pain gets worse or does not start to improve   · Fever of 100.4°F (38°C) or higher, or as directed by your healthcare provider  · Fluid or blood draining from the ear  · Headache or sinus pain  · Stiff neck  · Unusual drowsiness or confusion  Date Last Reviewed: 10/1/2016  © 0319-7984 [employer]. [address] [place] [address], [place] [address]. All rights reserved. This information is not intended as a substitute for professional medical care. Always follow your healthcare professional's instructions.

## 2019-11-22 NOTE — PROGRESS NOTES
"Subjective:       Patient ID: Ela Downs is a 24 y.o. female.    Vitals:  height is 5' 4.5" (1.638 m) and weight is 116.9 kg (257 lb 11.5 oz). Her oral temperature is 98 °F (36.7 °C). Her blood pressure is 163/90 (abnormal) and her pulse is 90. Her respiration is 18 and oxygen saturation is 99%.     Chief Complaint: URI    Nasal congestion, sore throat, mild dry cough associated with postnasal drip for the past 4 days. Vitamin C but no other medication. She states she has hx of white coat HTN, has monitored BP at home and it has been well.     URI    This is a new problem. The problem has been unchanged. There has been no fever. Associated symptoms include congestion, coughing and ear pain. Pertinent negatives include no abdominal pain, chest pain, diarrhea, dysuria, headaches, joint pain, joint swelling, nausea, neck pain, plugged ear sensation, rash, rhinorrhea, sinus pain, sneezing, sore throat, swollen glands, vomiting or wheezing. She has tried nothing for the symptoms. The treatment provided no relief.       Constitution: Negative for chills, sweating, fatigue, fever and unexpected weight change.   HENT: Positive for ear pain, congestion and postnasal drip. Negative for sinus pain, sinus pressure, sore throat, trouble swallowing and voice change.    Neck: Negative for neck pain, neck stiffness and painful lymph nodes.   Cardiovascular: Negative for chest pain, palpitations and sob on exertion.   Eyes: Negative for eye redness, vision loss and double vision.   Respiratory: Positive for cough. Negative for chest tightness, sputum production, bloody sputum, COPD, shortness of breath, stridor, wheezing and asthma.    Gastrointestinal: Negative for abdominal pain, nausea, vomiting and diarrhea.   Genitourinary: Negative for dysuria.   Musculoskeletal: Negative for pain and muscle ache.   Skin: Negative for rash.   Allergic/Immunologic: Negative for seasonal allergies, asthma and sneezing.   Neurological: " Negative for dizziness, history of vertigo, passing out, coordination disturbances, headaches, disorientation and loss of consciousness.   Hematologic/Lymphatic: Negative for swollen lymph nodes.   Psychiatric/Behavioral: Negative for disorientation.       Objective:      Physical Exam   Constitutional: She is oriented to person, place, and time. She appears well-developed and well-nourished. She is cooperative.  Non-toxic appearance. She does not have a sickly appearance. She does not appear ill. No distress.   HENT:   Head: Normocephalic and atraumatic.   Right Ear: Hearing, tympanic membrane, external ear and ear canal normal.   Left Ear: Hearing, tympanic membrane, external ear and ear canal normal.   Nose: Mucosal edema present. No rhinorrhea or nasal deformity. No epistaxis. Right sinus exhibits no maxillary sinus tenderness and no frontal sinus tenderness. Left sinus exhibits no maxillary sinus tenderness and no frontal sinus tenderness.   Mouth/Throat: Uvula is midline and mucous membranes are normal. No trismus in the jaw. Normal dentition. No uvula swelling. Posterior oropharyngeal erythema present. No oropharyngeal exudate, posterior oropharyngeal edema, tonsillar abscesses or cobblestoning. Tonsils are 1+ on the right. Tonsils are 1+ on the left. No tonsillar exudate.   Eyes: Conjunctivae and lids are normal. No scleral icterus.   Neck: Trachea normal, full passive range of motion without pain and phonation normal. Neck supple. No neck rigidity. No edema and no erythema present.   Cardiovascular: Normal rate, regular rhythm, normal heart sounds, intact distal pulses and normal pulses.   Pulmonary/Chest: Effort normal and breath sounds normal. No accessory muscle usage or stridor. No tachypnea and no bradypnea. No respiratory distress. She has no decreased breath sounds. She has no wheezes. She has no rhonchi. She has no rales.   Abdominal: Normal appearance.   Musculoskeletal: Normal range of motion. She  exhibits no edema or deformity.   Lymphadenopathy:        Head (right side): Submandibular adenopathy present. No preauricular and no posterior auricular adenopathy present.        Head (left side): Submandibular adenopathy present. No preauricular and no posterior auricular adenopathy present.     She has no cervical adenopathy.   Neurological: She is alert and oriented to person, place, and time. She exhibits normal muscle tone. Coordination normal.   Skin: Skin is warm, dry, intact, not diaphoretic and not pale.   Psychiatric: She has a normal mood and affect. Her speech is normal and behavior is normal. Judgment and thought content normal. Cognition and memory are normal.   Nursing note and vitals reviewed.          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  Assessment:       1. Viral upper respiratory infection        Plan:         Viral upper respiratory infection  -     predniSONE (DELTASONE) 20 MG tablet; Take 2 tablets (40 mg total) by mouth once daily for 2 days, THEN 1 tablet (20 mg total) once daily for 2 days.  Dispense: 6 tablet; Refill: 0      Patient Instructions   - Rest.    - Drink plenty of fluids.      - Viral upper respiratory infections typically run their course in 10-14 days.     - Tylenol or Ibuprofen as directed as needed for fever/pain. Avoid tylenol if you have a history of liver disease. Do not take ibuprofen if you have a history of GI bleeding, kidney disease, or if you take blood thinners.     - You can take over-the-counter claritin, zyrtec, allegra, or xyzal as directed. These are antihistamines that can help with runny nose, nasal congestion, sneezing, and helps to dry up post-nasal drip, which usually causes sore throat and cough.   - If you do NOT have high blood pressure, you may use a decongestant form (D)  of this medication or if you do not take the D form, you can take sudafed  (pseudoephedrine) over the counter, which is a  decongestant.    - You can use Flonase (fluticasone) nasal spray as directed for sinus congestion and postnasal drip. This is a steroid nasal spray that works locally over time to decrease the inflammation in your nose/sinuses and help with allergic symptoms. This is not an quick- relief spray like afrin, but it works well if used daily.  Discontinue if you develop nose bleed  - use nasal saline prior to Flonase.    - Use Ocean Spray Nasal Saline 1-3 puffs each nostril every 2-3 hours then blow out onto tissue. This is to irrigate the nasal passage way to clear the sinus openings. Use until sinus problem resolved.      - you can take plain Mucinex (guaifenesin) 1200 mg twice a day to help loosen mucous    -warm salt water gargles can help with sore throat    - warm tea with honey can help with cough. Honey is a natural cough suppressant.    - You received a steroid (prednisone) today.  This can elevate your blood pressure, elevate your blood sugar, water weight gain, nervous energy, redness to the face and dimpling of the skin where the shot goes in.   - Do not use steroids more than 3 times per year.   - If you have diabetes, please check you blood sugar frequently.  - If you have high blood pressure, please check your blood pressure frequently.     - Follow up with your PCP or specialty clinic as directed in the next 1-2 weeks if not improved or as needed.  You can call [phone] to schedule an appointment with the appropriate provider.      - Go to the ER if you develop new or worsening symptoms.     - You must understand that you have received an Urgent Care treatment only and that you may be released before all of your medical problems are known or treated.   - You, the patient, will arrange for follow up care as instructed.   - If your condition worsens or fails to improve we recommend that you receive another evaluation at the ER immediately or contact your PCP to discuss your concerns or return here.      Elevated Blood Pressure  Your blood pressure was elevated during your visit to the urgent care.  It was not so high that immediate care was needed but it is recommended that you monitor your blood pressure over the next week or two to make sure that it is not staying elevated.  Please have your blood pressure taken 2-3 times daily at different times of the day.  Write all of those blood pressures down and record the time that they were taken.  Keep all that information and take it with you to see your Primary Care Physician.  If your blood pressure is consistently above 140/90 you will need to follow up with your PCP more quickly        Viral Upper Respiratory Illness (Adult)  You have a viral upper respiratory illness (URI), which is another term for the common cold. This illness is contagious during the first few days. It is spread through the air by coughing and sneezing. It may also be spread by direct contact (touching the sick person and then touching your own eyes, nose, or mouth). Frequent handwashing will decrease risk of spread. Most viral illnesses go away within 7 to 10 days with rest and simple home remedies. Sometimes the illness may last for several weeks. Antibiotics will not kill a virus, and they are generally not prescribed for this condition.    Home care  · If symptoms are severe, rest at home for the first 2 to 3 days. When you resume activity, don't let yourself get too tired.  · Avoid being exposed to cigarette smoke (yours or others).  · You may use acetaminophen or ibuprofen to control pain and fever, unless another medicine was prescribed. (Note: If you have chronic liver or kidney disease, have ever had a stomach ulcer or gastrointestinal bleeding, or are taking blood-thinning medicines, talk with your healthcare provider before using these medicines.) Aspirin should never be given to anyone under 18 years of age who is ill with a viral infection or fever. It may cause severe liver  or brain damage.  · Your appetite may be poor, so a light diet is fine. Avoid dehydration by drinking 6 to 8 glasses of fluids per day (water, soft drinks, juices, tea, or soup). Extra fluids will help loosen secretions in the nose and lungs.  · Over-the-counter cold medicines will not shorten the length of time youre sick, but they may be helpful for the following symptoms: cough, sore throat, and nasal and sinus congestion. (Note: Do not use decongestants if you have high blood pressure.)  Follow-up care  Follow up with your healthcare provider, or as advised.  When to seek medical advice  Call your healthcare provider right away if any of these occur:  · Cough with lots of colored sputum (mucus)  · Severe headache; face, neck, or ear pain  · Difficulty swallowing due to throat pain  · Fever of 100.4°F (38°C)  Call 911, or get immediate medical care  Call emergency services right away if any of these occur:  · Chest pain, shortness of breath, wheezing, or difficulty breathing  · Coughing up blood  · Inability to swallow due to throat pain  Date Last Reviewed: 9/13/2015  © 9482-9933 [employer]. [address] [place] [address]. All rights reserved. This information is not intended as a substitute for professional medical care. Always follow your healthcare professional's instructions.        Earache, No Infection (Adult)  Earaches can happen without an infection. This occurs when air and fluid build up behind the eardrum causing a feeling of fullness and discomfort and reduced hearing. This is called otitis media with effusion (OME) or serous otitis media. It means there is fluid in the middle ear. It is not the same as acute otitis media, which is typically from infection.  OME can happen when you have a cold if congestion blocks the passage that drains the middle ear. This passage is called the eustachian tube. OME may also occur with nasal allergies or after a bacterial middle ear  infection.    The pain or discomfort may come and go. You may hear clicking or popping sounds when you chew or swallow. You may feel that your balance is off. Or you may hear ringing in the ear.  It often takes from several weeks up to 3 months for the fluid to clear on its own. Oral pain relievers and ear drops help if there is pain. Decongestants and antihistamines sometimes help. Antibiotics don't help since there is no infection. Your doctor may prescribe a nasal spray to help reduce swelling in the nose and eustachian tube. This can allow the ear to drain.  If your OME doesn't improve after 3 months, surgery may be used to drain the fluid and insert a small tube in the eardrum to allow continued drainage.  Because the middle ear fluid can become infected, it is important to watch for signs of an ear infection which may develop later. These signs include increased ear pain, fever, or drainage from the ear.  Home care  The following guidelines will help you care for yourself at home:  · You may use over-the-counter medicine as directed to control pain, unless another medicine was prescribed. If you have chronic liver or kidney disease or ever had a stomach ulcer or GI bleeding, talk with your doctor before using these medicines. Aspirin should never be used in anyone under 18 years of age who is ill with a fever. It may cause severe liver damage.  · You may use over-the-counter decongestants such as phenylephrine or pseudoephedrine. But they are not always helpful. Don't use nasal spray decongestants more than 3 days. Longer use can make congestion worse. Prescription nasal sprays from your doctor don't typically have those restrictions.  · Antihistamines may help if you are also having allergy symptoms.  · You may use medicines such as guaifenesin to thin mucus and promote drainage.  Follow-up care  Follow up with your healthcare provider or as advised if you are not feeling better after 3 days.  When to seek  medical advice  Call your healthcare provider right away if any of the following occur:  · Your ear pain gets worse or does not start to improve   · Fever of 100.4°F (38°C) or higher, or as directed by your healthcare provider  · Fluid or blood draining from the ear  · Headache or sinus pain  · Stiff neck  · Unusual drowsiness or confusion  Date Last Reviewed: 10/1/2016  © 4076-8968 [employer]. [address] [place] [address], [place] [id number]. All rights reserved. This information is not intended as a substitute for professional medical care. Always follow your healthcare professional's instructions.

## 2020-02-24 ENCOUNTER — HOSPITAL ENCOUNTER (EMERGENCY)
Facility: OTHER | Age: 25
Discharge: HOME | End: 2020-02-24
Attending: EMERGENCY MEDICINE
Payer: MEDICAID

## 2020-02-24 VITALS
SYSTOLIC BLOOD PRESSURE: 167 MMHG | HEART RATE: 74 BPM | HEIGHT: 65 IN | WEIGHT: 250 LBS | TEMPERATURE: 98 F | OXYGEN SATURATION: 99 % | DIASTOLIC BLOOD PRESSURE: 94 MMHG | RESPIRATION RATE: 20 BRPM | BODY MASS INDEX: 41.65 KG/M2

## 2020-02-24 DIAGNOSIS — R16.0 LIVER MASS, RIGHT LOBE: ICD-10-CM

## 2020-02-24 DIAGNOSIS — N20.1 RIGHT URETERAL STONE: Primary | ICD-10-CM

## 2020-02-24 LAB
ANION GAP SERPL CALC-SCNC: 13 MMOL/L (ref 8–16)
B-HCG UR QL: NEGATIVE
BACTERIA #/AREA URNS HPF: ABNORMAL /HPF
BILIRUB UR QL STRIP: NEGATIVE
BUN SERPL-MCNC: 11 MG/DL (ref 6–20)
CALCIUM SERPL-MCNC: 9.4 MG/DL (ref 8.7–10.5)
CHLORIDE SERPL-SCNC: 107 MMOL/L (ref 95–110)
CLARITY UR: ABNORMAL
CO2 SERPL-SCNC: 23 MMOL/L (ref 23–29)
COLOR UR: YELLOW
CREAT SERPL-MCNC: 0.7 MG/DL (ref 0.5–1.4)
CTP QC/QA: YES
EST. GFR  (AFRICAN AMERICAN): >60 ML/MIN/1.73 M^2
EST. GFR  (NON AFRICAN AMERICAN): >60 ML/MIN/1.73 M^2
GLUCOSE SERPL-MCNC: 116 MG/DL (ref 70–110)
GLUCOSE UR QL STRIP: NEGATIVE
HGB UR QL STRIP: ABNORMAL
HYALINE CASTS #/AREA URNS LPF: 0 /LPF
KETONES UR QL STRIP: NEGATIVE
LEUKOCYTE ESTERASE UR QL STRIP: NEGATIVE
MICROSCOPIC COMMENT: ABNORMAL
NITRITE UR QL STRIP: NEGATIVE
PH UR STRIP: 6 [PH] (ref 5–8)
POTASSIUM SERPL-SCNC: 4.2 MMOL/L (ref 3.5–5.1)
PROT UR QL STRIP: ABNORMAL
RBC #/AREA URNS HPF: 20 /HPF (ref 0–4)
SODIUM SERPL-SCNC: 143 MMOL/L (ref 136–145)
SP GR UR STRIP: >=1.03 (ref 1–1.03)
SQUAMOUS #/AREA URNS HPF: 15 /HPF
URN SPEC COLLECT METH UR: ABNORMAL
UROBILINOGEN UR STRIP-ACNC: NEGATIVE EU/DL
WBC #/AREA URNS HPF: 10 /HPF (ref 0–5)

## 2020-02-24 PROCEDURE — 96375 TX/PRO/DX INJ NEW DRUG ADDON: CPT

## 2020-02-24 PROCEDURE — 99284 EMERGENCY DEPT VISIT MOD MDM: CPT | Mod: 25

## 2020-02-24 PROCEDURE — 25000003 PHARM REV CODE 250: Performed by: EMERGENCY MEDICINE

## 2020-02-24 PROCEDURE — 81000 URINALYSIS NONAUTO W/SCOPE: CPT

## 2020-02-24 PROCEDURE — 96361 HYDRATE IV INFUSION ADD-ON: CPT

## 2020-02-24 PROCEDURE — 96374 THER/PROPH/DIAG INJ IV PUSH: CPT

## 2020-02-24 PROCEDURE — 63600175 PHARM REV CODE 636 W HCPCS: Performed by: EMERGENCY MEDICINE

## 2020-02-24 PROCEDURE — 96376 TX/PRO/DX INJ SAME DRUG ADON: CPT

## 2020-02-24 PROCEDURE — 80048 BASIC METABOLIC PNL TOTAL CA: CPT

## 2020-02-24 PROCEDURE — 81025 URINE PREGNANCY TEST: CPT | Performed by: EMERGENCY MEDICINE

## 2020-02-24 RX ORDER — SODIUM CHLORIDE 9 MG/ML
1000 INJECTION, SOLUTION INTRAVENOUS
Status: COMPLETED | OUTPATIENT
Start: 2020-02-24 | End: 2020-02-24

## 2020-02-24 RX ORDER — ONDANSETRON 8 MG/1
8 TABLET, ORALLY DISINTEGRATING ORAL EVERY 6 HOURS PRN
Qty: 15 TABLET | Refills: 0 | Status: SHIPPED | OUTPATIENT
Start: 2020-02-24 | End: 2020-02-29

## 2020-02-24 RX ORDER — ONDANSETRON 2 MG/ML
4 INJECTION INTRAMUSCULAR; INTRAVENOUS
Status: COMPLETED | OUTPATIENT
Start: 2020-02-24 | End: 2020-02-24

## 2020-02-24 RX ORDER — KETOROLAC TROMETHAMINE 30 MG/ML
30 INJECTION, SOLUTION INTRAMUSCULAR; INTRAVENOUS
Status: COMPLETED | OUTPATIENT
Start: 2020-02-24 | End: 2020-02-24

## 2020-02-24 RX ORDER — HYDROCODONE BITARTRATE AND ACETAMINOPHEN 5; 325 MG/1; MG/1
1 TABLET ORAL EVERY 8 HOURS PRN
Qty: 9 TABLET | Refills: 0 | Status: SHIPPED | OUTPATIENT
Start: 2020-02-24 | End: 2020-02-27

## 2020-02-24 RX ORDER — MORPHINE SULFATE 2 MG/ML
2 INJECTION, SOLUTION INTRAMUSCULAR; INTRAVENOUS
Status: COMPLETED | OUTPATIENT
Start: 2020-02-24 | End: 2020-02-24

## 2020-02-24 RX ORDER — KETOROLAC TROMETHAMINE 30 MG/ML
15 INJECTION, SOLUTION INTRAMUSCULAR; INTRAVENOUS
Status: COMPLETED | OUTPATIENT
Start: 2020-02-24 | End: 2020-02-24

## 2020-02-24 RX ORDER — IBUPROFEN 800 MG/1
800 TABLET ORAL EVERY 8 HOURS PRN
Qty: 20 TABLET | Refills: 0 | Status: SHIPPED | OUTPATIENT
Start: 2020-02-24

## 2020-02-24 RX ORDER — TAMSULOSIN HYDROCHLORIDE 0.4 MG/1
0.4 CAPSULE ORAL DAILY
Qty: 30 CAPSULE | Refills: 0 | Status: SHIPPED | OUTPATIENT
Start: 2020-02-24 | End: 2020-03-25

## 2020-02-24 RX ADMIN — ONDANSETRON HYDROCHLORIDE 4 MG: 2 INJECTION INTRAMUSCULAR; INTRAVENOUS at 03:02

## 2020-02-24 RX ADMIN — SODIUM CHLORIDE 1000 ML: 0.9 INJECTION, SOLUTION INTRAVENOUS at 04:02

## 2020-02-24 RX ADMIN — KETOROLAC TROMETHAMINE 15 MG: 30 INJECTION, SOLUTION INTRAMUSCULAR; INTRAVENOUS at 06:02

## 2020-02-24 RX ADMIN — SODIUM CHLORIDE 1000 ML: 0.9 INJECTION, SOLUTION INTRAVENOUS at 03:02

## 2020-02-24 RX ADMIN — MORPHINE SULFATE 2 MG: 2 INJECTION, SOLUTION INTRAMUSCULAR; INTRAVENOUS at 05:02

## 2020-02-24 RX ADMIN — KETOROLAC TROMETHAMINE 30 MG: 30 INJECTION, SOLUTION INTRAMUSCULAR; INTRAVENOUS at 03:02

## 2020-02-24 NOTE — ED TRIAGE NOTES
Pt arrives to Ed with c/o flank pain with onset tonight. Pt reports history of kidney stones. Pt reports decreased urine output and cloudiness. Pt pacing in room, respirations even, unlabored, eyes open spontaneously, NAD noted, AAOx4. Pt placed on BP cycling, pulse ox.

## 2020-02-24 NOTE — ED PROVIDER NOTES
"Encounter Date: 2/24/2020    SCRIBE #1 NOTE: I, Hayder Barrios, am scribing for, and in the presence of, Dr. Hargrove.       History     Chief Complaint   Patient presents with    Flank Pain     RIGHT constant, "fullness" x1 week that worsened tonight. Hx kidney stones- states pain is similiar to prior episodes. Taking IBU without relief.      Time seen by provider: 3:07 AM     This is a 24 y.o. Female with hx of kidney stones who presents with complaint of right-sided flank pain for a week. Pt notes a previous episode of right flank pain six days ago that only lasted for ten minutes. However, she reports the current "fullness" pain feels different from that previous episode. She reports pain alleviates with heat and worsens with inspiration and palpation. She reports associated nausea, decreased urine, and "cloudy" urine. She denies fever, hematuria, vaginal bleeding, or vaginal discharge. Pt's first kidney stone was in March of 2018, and her most recent kidney stone was one year ago.    The history is provided by the patient.     Review of patient's allergies indicates:  No Known Allergies  Past Medical History:   Diagnosis Date    Kidney stone     Kidney stone 3/6/2018    PCOS (polycystic ovarian syndrome) 12/21/2015     Past Surgical History:   Procedure Laterality Date    KIDNEY STONE SURGERY      LITHOTRIPSY       Family History   Problem Relation Age of Onset    Diabetes Paternal Grandmother     Heart disease Paternal Grandmother     Diabetes Paternal Grandfather     Diabetes Maternal Grandmother     Heart disease Maternal Grandmother     Diabetes Maternal Grandfather     Heart disease Maternal Grandfather     Colon cancer Neg Hx     Esophageal cancer Neg Hx      Social History     Tobacco Use    Smoking status: Never Smoker    Smokeless tobacco: Never Used   Substance Use Topics    Alcohol use: Not on file     Comment: once monthly    Drug use: No     Review of Systems   Constitutional: " Negative for fever.   HENT: Negative for sore throat.    Eyes: Negative for visual disturbance.   Respiratory: Negative for cough.    Cardiovascular: Negative for chest pain.   Gastrointestinal: Positive for abdominal pain and nausea.   Genitourinary: Positive for decreased urine volume. Negative for hematuria, vaginal discharge and vaginal pain.        Positive for cloudy urine.   Musculoskeletal: Negative for neck pain.   Skin: Negative for rash.   Neurological: Negative for weakness.   Psychiatric/Behavioral: Negative for confusion.       Physical Exam     Initial Vitals [02/24/20 0255]   BP Pulse Resp Temp SpO2   (!) 175/119 108 20 98.2 °F (36.8 °C) 98 %      MAP       --         Physical Exam    Nursing note and vitals reviewed.  Constitutional: She appears well-developed and well-nourished. She is not diaphoretic. No distress.   HENT:   Head: Normocephalic and atraumatic.   Eyes: EOM are normal. Pupils are equal, round, and reactive to light.   Neck: Normal range of motion. Neck supple.   Cardiovascular: Normal rate, regular rhythm and normal heart sounds. Exam reveals no gallop and no friction rub.    No murmur heard.  Pulmonary/Chest: Breath sounds normal. No respiratory distress. She has no wheezes. She has no rhonchi. She has no rales.   Abdominal: Soft. Bowel sounds are normal. She exhibits no distension. There is no tenderness.   Musculoskeletal: Normal range of motion.   No CVA tenderness.   Neurological: She is alert and oriented to person, place, and time.   Skin: Skin is warm and dry.   Psychiatric: She has a normal mood and affect.         ED Course   Procedures  Labs Reviewed   URINALYSIS, REFLEX TO URINE CULTURE - Abnormal; Notable for the following components:       Result Value    Appearance, UA Cloudy (*)     Specific Gravity, UA >=1.030 (*)     Protein, UA 1+ (*)     Occult Blood UA 3+ (*)     All other components within normal limits    Narrative:     Preferred Collection Type->Urine, Clean  Catch   BASIC METABOLIC PANEL - Abnormal; Notable for the following components:    Glucose 116 (*)     All other components within normal limits   URINALYSIS MICROSCOPIC - Abnormal; Notable for the following components:    RBC, UA 20 (*)     WBC, UA 10 (*)     All other components within normal limits    Narrative:     Preferred Collection Type->Urine, Clean Catch   POCT URINE PREGNANCY          Imaging Results           CT Renal Stone Study ABD Pelvis WO (Final result)  Result time 02/24/20 04:32:05    Final result by Juan Jose Garcia MD (02/24/20 04:32:05)                 Impression:      Mid right ureteral calculus with obstructive findings as above.    Large hepatic mass lesion, if previously unknown hepatic mass protocol MRI follow-up is recommended, benign and malignant etiologies would be in the differential.    Bilateral nonobstructing nephrolithiasis is also noted.    This report was flagged in Epic as abnormal.      Electronically signed by: Juan Jose Garcia  Date:    02/24/2020  Time:    04:32             Narrative:    EXAMINATION:  CT RENAL STONE STUDY ABD PELVIS WO    CLINICAL HISTORY:  Flank pain, stone disease suspected;    TECHNIQUE:  Low dose axial images, sagittal and coronal reformations were obtained from the lung bases to the pubic symphysis.  Contrast was not administered.    COMPARISON:  June 12, 2018    FINDINGS:  As best seen on axial image 90, coronal image 77 there is a calculus along the mid right ureter that measures up to approximately 3.1 x 3.3 mm on axial imaging, approximately 4.4 mm in the craniocaudal dimension on coronal reconstruction imaging.  There is associated mild right hydroureter and hydronephrosis.  Bilateral nonobstructing nephrolithiasis is noted there is no evidence for left-sided ureteral calculus or obstructive uropathy.  The urinary bladder is decompressed.    The visualized lung bases appear clear.  When accounting for limitations of the exam there is no evidence  for acute process of the stomach, pancreas, spleen or adrenal glands.  The gallbladder appears unremarkable.  Diminished attenuation of the liver consistent with fatty infiltrate with evidence for fatty sparing adjacent to gallbladder fossa is noted.  There is a large heterogeneous hepatic mass lesion noted involving the right lobe of the liver, as seen on axial image 35 this measures up to approximately 13.4 by 8.6 cm on axial imaging, and measures approximately 12.4 cm in the craniocaudal dimension on coronal reconstruction imaging.  If previously unknown further evaluation with hepatic mass protocol MRI is recommended.    The abdominal aorta appears normal in caliber otherwise not well evaluated on this exam, the uterus and adnexa appear unremarkable.  There is no evidence for small bowel obstructive process.  The appendix is identified and does not appear inflamed.  There is no evidence for inflammatory or obstructive process of the colon.  There is no evidence for free intraperitoneal air.  The visualized osseous structures appear intact.                                 Medical Decision Making:   History:   Old Medical Records: I decided to obtain old medical records.  Initial Assessment:   24-year-old female with history of kidney stones presents complaining of right-sided flank pain that began last night.  Patient very uncomfortable appearing but No abdominal or CVA tenderness on exam.  Clinical Tests:   Lab Tests: Ordered and Reviewed  ED Management:  Pain improved upon reassessment after IV fluids and Toradol.  Will give additional IV fluids and 2 mg of morphine.  UA negative for urinary tract infection.  CT does show a 3 x 4 mm right ureteral stone with mild hydronephrosis.  There is also an incidental large mass in the right lobe of the liver.  BMP significant for a normal creatinine.  Findings were discussed with the patient and it was stressed to her the importance of following up with primary care for  further dedicated imaging of her liver to further evaluate this liver mass since the patient states that she has not been evaluated for this in the past.  She does report being told that they saw something on an ultrasound but that it had later disappeared.  She was also given information to follow up with Urology for further evaluation.  She will be discharged home with a prescription for Flomax, ibuprofen, Norco, and Zofran.            Scribe Attestation:   Scribe #1: I performed the above scribed service and the documentation accurately describes the services I performed. I attest to the accuracy of the note.    Attending Attestation:           Physician Attestation for Scribe:  Physician Attestation Statement for Scribe #1: I, Dr. Hargrove, reviewed documentation, as scribed by Hayder Barrios in my presence, and it is both accurate and complete.                               Clinical Impression:     1. Right ureteral stone    2. Liver mass, right lobe                                Grecia Hargrove MD  02/24/20 0514

## 2020-02-24 NOTE — DISCHARGE INSTRUCTIONS
It is very important he follow up for further imaging (MRI) of your liver for evaluation of the liver mass seen on CT today.

## 2020-10-09 ENCOUNTER — TELEPHONE (OUTPATIENT)
Dept: OTOLARYNGOLOGY | Facility: CLINIC | Age: 25
End: 2020-10-09

## 2020-10-09 ENCOUNTER — PATIENT MESSAGE (OUTPATIENT)
Dept: OTOLARYNGOLOGY | Facility: CLINIC | Age: 25
End: 2020-10-09

## 2020-10-09 DIAGNOSIS — Z03.818 ENCOUNTER FOR OBSERVATION FOR SUSPECTED EXPOSURE TO OTHER BIOLOGICAL AGENTS RULED OUT: ICD-10-CM

## 2020-10-09 NOTE — TELEPHONE ENCOUNTER
----- Message from Tonya Toribio RN sent at 10/6/2020  5:32 PM CDT -----  Regarding: FW: pts mom  Tomorrow   ----- Message -----  From: Antoinette Bowens  Sent: 10/6/2020   4:21 PM CDT  To: Terrence [facility]IV[facility] Staff  Subject: pts mom                                          Patient Requesting Sooner Appointment.     Reason for sooner appt.: pts mom is calling to speak with the nurse pt needs to be seen for a voice problem pt is a tabares and is affecting her voice   When is the first available appointment? 11/20/2020  Communication Preference: can you please call pt mom at [phone]  Additional Information: none    FER

## 2020-10-14 ENCOUNTER — PATIENT MESSAGE (OUTPATIENT)
Dept: OTOLARYNGOLOGY | Facility: CLINIC | Age: 25
End: 2020-10-14

## 2020-10-23 ENCOUNTER — LAB VISIT (OUTPATIENT)
Dept: OTOLARYNGOLOGY | Facility: CLINIC | Age: 25
End: 2020-10-23
Payer: COMMERCIAL

## 2020-10-23 DIAGNOSIS — Z03.818 ENCOUNTER FOR OBSERVATION FOR SUSPECTED EXPOSURE TO OTHER BIOLOGICAL AGENTS RULED OUT: ICD-10-CM

## 2020-10-23 PROCEDURE — U0003 INFECTIOUS AGENT DETECTION BY NUCLEIC ACID (DNA OR RNA); SEVERE ACUTE RESPIRATORY SYNDROME CORONAVIRUS 2 (SARS-COV-2) (CORONAVIRUS DISEASE [COVID-19]), AMPLIFIED PROBE TECHNIQUE, MAKING USE OF HIGH THROUGHPUT TECHNOLOGIES AS DESCRIBED BY CMS-2020-01-R: HCPCS

## 2020-10-24 LAB — SARS-COV-2 RNA RESP QL NAA+PROBE: NOT DETECTED

## 2020-10-26 ENCOUNTER — OFFICE VISIT (OUTPATIENT)
Dept: OTOLARYNGOLOGY | Facility: CLINIC | Age: 25
End: 2020-10-26
Payer: COMMERCIAL

## 2020-10-26 VITALS
BODY MASS INDEX: 43.32 KG/M2 | SYSTOLIC BLOOD PRESSURE: 162 MMHG | DIASTOLIC BLOOD PRESSURE: 90 MMHG | HEIGHT: 65 IN | WEIGHT: 260 LBS

## 2020-10-26 DIAGNOSIS — R49.0 DYSPHONIA: Primary | ICD-10-CM

## 2020-10-26 DIAGNOSIS — R49.0 MUSCLE TENSION DYSPHONIA: ICD-10-CM

## 2020-10-26 DIAGNOSIS — J38.5 LARYNGEAL SPASM: ICD-10-CM

## 2020-10-26 DIAGNOSIS — K21.9 GASTROESOPHAGEAL REFLUX DISEASE, UNSPECIFIED WHETHER ESOPHAGITIS PRESENT: ICD-10-CM

## 2020-10-26 PROCEDURE — 99999 PR PBB SHADOW E&M-EST. PATIENT-LVL V: ICD-10-PCS | Mod: PBBFAC,,, | Performed by: OTOLARYNGOLOGY

## 2020-10-26 PROCEDURE — 31579 PR LARYNGOSCOPY, FLEX/RIGID TELESCOPIC, W/STROBOSCOPY: ICD-10-PCS | Mod: S$GLB,,, | Performed by: OTOLARYNGOLOGY

## 2020-10-26 PROCEDURE — 99203 PR OFFICE/OUTPT VISIT, NEW, LEVL III, 30-44 MIN: ICD-10-PCS | Mod: 25,S$GLB,, | Performed by: OTOLARYNGOLOGY

## 2020-10-26 PROCEDURE — 99203 OFFICE O/P NEW LOW 30 MIN: CPT | Mod: 25,S$GLB,, | Performed by: OTOLARYNGOLOGY

## 2020-10-26 PROCEDURE — 31579 LARYNGOSCOPY TELESCOPIC: CPT | Mod: S$GLB,,, | Performed by: OTOLARYNGOLOGY

## 2020-10-26 PROCEDURE — 99999 PR PBB SHADOW E&M-EST. PATIENT-LVL V: CPT | Mod: PBBFAC,,, | Performed by: OTOLARYNGOLOGY

## 2020-10-26 RX ORDER — AMOXICILLIN 500 MG
CAPSULE ORAL DAILY
COMMUNITY

## 2020-10-26 RX ORDER — METFORMIN HYDROCHLORIDE 500 MG/1
TABLET ORAL
COMMUNITY
Start: 2020-09-24

## 2020-10-26 NOTE — Clinical Note
Please schedule eval/treat. Delightful , with mild, mostly speaking-related MTD. Thanks. She requested to stay in [place] and would prefer an in-person visit if you are okay with it.

## 2020-10-26 NOTE — PATIENT INSTRUCTIONS
MUSCLE TENSION DYSPHONIA     What is MTD?     Muscle tension dysphonia (MTD) is a condition in which laryngeal muscles are overactive, causing voice fatigue and discomfort with use. Sometimes MTD accompanies another organic condition, but it can also occur on its own. The overactivity of the muscles can result in incomplete closure of the vocal folds. Sometimes, the false vocal folds are overactive as well.     MTD is caused by over-activity in some laryngeal muscles, which is sometimes caused by the underactivity of other muscles. If there is an underlying condition, like nodules or an upper respiratory infection, the overcompensation to produce voice becomes a learned pattern.     How is MTD treated?     Voice therapy is the first line of treatment for MTD. There are a wide variety of treatment approaches, including laryngeal massage.             WHAT TO EXPECT FROM VOICE THERAPY    Purpose  The purpose of voice therapy is to help you find a better, more efficient way to use your voice or to reduce symptoms such as coughing, throat clearing, or difficulty breathing.  Depending on your symptoms, you may learn how to produce clearer voice quality, how to reduce fatigue or pain associated with speaking, how to take care of your voice, and how to eliminate chronic coughing or throat clearing.      Process: Evaluation  First, you may go through some initial testing.  In most cases, a videostroboscopy will be performed in order to allow your speech pathologist and your physician to look at your vocal cords to aid in deciding if you would benefit from voice therapy.  Next, you may work with the speech pathologist to assess the current capabilities of your voice.  Following evaluation, your speech pathologist will design a therapeutic plan to improve your voice as well as other symptoms that may bother you.  At the time of evaluation, your speech pathologist may provide you with exercises to try at home.      Process:  Therapy  Most patients benefit from 2-8 sessions over 1-3 months.  Voice therapy involves changing the behavior of your vocal cords and speaking habits, so it is very important that you attend your appointments and do home practices as instructed by your speech pathologist.  Home practice and participation in therapy are critical to meeting your desired voice goals, so of course, we are able to work with you to schedule appointments that are convenient for you.            ACID REFLUX   What is acid reflux?    When we eat, food passes from the throat and into the stomach through a tube called the esophagus. At the bottom of the esophagus is a ring of muscles that acts as a valve between the esophagus and stomach, called the lower esophageal sphincter. Smoking, alcohol, and certain types of food may weaken the sphincter, so it may stop closing properly. The contents in the stomach then may leak back, or reflux, into the esophagus. This problem is called gastroesophageal reflux disease (GERD). Symptoms of GERD include heartburn, belching, regurgitation of stomach contents, and swallowing difficulties.    Sometimes, the stomach acid travels up through the esophagus and spills into the larynx or pharynx (voice box). This is called laryngopharyngeal reflux (LPR) and is irritating to the vocal folds and surrounding tissues. Often, patients with LPR do not experience heartburn as a symptom. More commonly, symptoms of LPR include hoarseness, excessive mucous resulting in frequent throat clearing, post-nasal drip, coughing, throat soreness or burning, choking episodes, difficulty swallowing, and sensation of a lump in the throat.     How is acid reflux treated?   Treatment for acid reflux can involve any combination of medication, lifestyle modifications, and surgery.   · Medications. Your doctor may prescribe a proton pump inhibitor (PPI) or an H2 blocker. If you are prescribed a PPI, take in on an empty stomach in the  morning 30 minutes prior to eating breakfast. Keep in mind that it may take 4-6 weeks before symptoms begin to resolve, so do not stop medications without consulting your doctor.   · Lifestyle and dietary modifications. Eat smaller meals at a slower pace. Avoid over-eating. If you are overweight, try to lose weight. Do not lie down or exercise directly after eating; eat your last meal of the day at least 2-3 hours prior to going to sleep. Avoid tight-fitting clothes. If you are a smoker, reduce or quit smoking. Elevate your head of bed 4-6 inches by putting phone books under the legs at the head of your bed or buy a wedge pillow, but do not use more than two regular pillows as this causes the body to curl and compresses your stomach.     Food group Foods to avoid to reduce reflux   Beverages  Whole milk, 2% milk, chocolate milk/hot chocolate, alcohol, coffee (regular and decaf), caffeinated tea, mint tea, carbonated beverages, citrus juice    Breads/grains Commercial sweet rolls, doughnuts, croissants, and other high-fat pastries    Fruits and vegetables Fried or cream-style vegetables, tomatoes, tomato-based products, citrus fruits, hot peppers    Soups and seasonings Cream, cheese, tomato-based soups, vinegar    Meats and proteins Fatty or fried meat/fish, taylor, sausage, pepperoni, lunch meat, fried eggs    Fats and oil Lard, taylor drippings, salt pork, meat drippings, gravies, highly seasoned salad dressings, nuts    Sweets/desserts Anything made with or from chocolate, peppermint, spearmint, whole milk, or cream; high-fat pastries, gum, hard candy           Fluticasone nasal spray  What is this medicine?  FLUTICASONE (floo TIK a sone) is a corticosteroid. This medicine is used to treat the symptoms of allergies like sneezing, itchy red eyes, and itchy, runny, or stuffy nose.  How should I use this medicine?  This medicine is for use in the nose. Follow the directions on your product or prescription label. This  medicine works best if used at regular intervals. Do not use more often than directed. Make sure that you are using your nasal spray correctly. After 6 months of daily use without a prescription, talk to your doctor or health care professional before using it for a longer time. Ask your doctor or health care professional if you have any questions.  Talk to your pediatrician regarding the use of this medicine in children. Special care may be needed. This medicine has been used in children as young as 2 years. After two months of daily use without a prescription in a child, talk to your pediatrician before using it for a longer time.  What side effects may I notice from receiving this medicine?  Side effects that you should report to your doctor or health care professional as soon as possible:  · allergic reactions like skin rash, itching or hives, swelling of the face, lips, or tongue  · changes in vision  · flu-like symptoms  · white patches or sores in the mouth or nose  Side effects that usually do not require medical attention (report to your doctor or health care professional if they continue or are bothersome):  · burning or irritation inside the nose or throat  · cough  · headache  · nosebleed  · unusual taste or smell  What may interact with this medicine?  · ketoconazole  · metyrapone  · some medicines for HIV  · vaccines  What if I miss a dose?  If you miss a dose, use it as soon as you remember. If it is almost time for your next dose, use only that dose and continue with your regular schedule. Do not use double or extra doses.  Where should I keep my medicine?  Keep out of the reach of children.  Store at room temperature between 15 and 30 degrees C (59 and 86 degrees F). Throw away any unused medicine after the expiration date.  What should I tell my health care provider before I take this medicine?  They need to know if you have any of these conditions:  · infection, like tuberculosis, herpes, or fungal  infection  · recent surgery on nose or sinuses  · taking corticosteroid by mouth  · an unusual or allergic reaction to fluticasone, steroids, other medicines, foods, dyes, or preservatives  · pregnant or trying to get pregnant  · breast-feeding  What should I watch for while using this medicine?  Visit your doctor or health care professional for regular checks on your progress. Some symptoms may improve within 12 hours after starting use. Check with your doctor or health care professional if there is no improvement in your condition after 3 weeks of use.  Do not come in contact with people who have chickenpox or the measles while you are taking this medicine. If you do, call your doctor right away.  NOTE:This sheet is a summary. It may not cover all possible information. If you have questions about this medicine, talk to your doctor, pharmacist, or health care provider. Copyright© 2017 [employer]

## 2020-10-26 NOTE — PROGRESS NOTES
[facility]  Department of Otorhinolaryngology and Communication Sciences    Ela Downs is a 25 y.o. female who [facility] the [facility] self-referred for further management of hoarseness.     She complains of very mild tension in her neck/throat with speaking, as well as slightly when singing in her speech register. She sings opera as a high soprano and is otherwise unimpaired when singing. Her voice is more problematic when talking.     Her symptoms began several weeks ago. She has been having bad allergies lately. She woke up one morning with horrible tension in the throat and her speaking voice was strained, associated with pinpoint discomfort on the left side. Eventually she felt something pop and felt some regurgitation of contents into the throat. Her symptoms thereafter were a lot better and have continued to improve. She persists with vocal fatigue and mild tension when speaking.    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 She got an undergrad degree in vocal performance from [employer] in [place].    She was having bad heartburn earlier this spring (took OTC omeprazole). She takes Tums PRN. She also complains of allergies--nasal congestion (bilateral), excess phlegm especially in the morning, scratchy throat. She occasionally has mild expiratory wheezing.    She is presently using an OTC decongestant spray most days, for the last 2 weeks.      Past Medical History  She has a past medical history of Kidney stone, Kidney stone, and PCOS (polycystic ovarian syndrome). DM type II.    Past Surgical History  She has a past surgical history that includes Kidney stone surgery and Lithotripsy.    Family History  Her family history includes Diabetes in her maternal grandfather, maternal grandmother, paternal grandfather, and paternal grandmother; Heart disease in her maternal grandfather, maternal grandmother, and paternal  "grandmother.    Social History  She reports that she has never smoked. She has never used smokeless tobacco. She reports that she does not use drugs.    Allergies  She is allergic to latex, natural rubber.    Medications  She has a current medication list which includes the following prescription(s): levonorgestrel-ethinyl estradiol, metformin, multivitamin, fish oil-omega-3 fatty acids, alprazolam, ibuprofen, and tamsulosin.    Review of Systems   Constitutional: Negative for fever.   HENT: Negative for sore throat.    Eyes: Negative for visual disturbance.   Respiratory: Negative for wheezing.    Cardiovascular: Negative for chest pain.   Gastrointestinal: Negative for nausea.   Musculoskeletal: Negative for arthralgias.   Skin: Negative for rash.   Neurological: Negative for tremors.   Hematological: Does not bruise/bleed easily.   Psychiatric/Behavioral: The patient is not nervous/anxious.           Objective:     BP (!) 162/90 (BP Location: Left arm, Patient Position: Sitting)   Ht 5' 5" (1.651 m)   Wt 117.9 kg (260 lb)   BMI 43.27 kg/m²      Physical Exam    Constitutional: comfortable, well dressed  Psychiatric: appropriate affect  Respiratory: comfortably breathing, symmetric chest rise, no stridor  Voice: normal for age/gender  Cardiovascular: upper extremities non-edematous  Lymphatic: no cervical lymphadenopathy  Neurologic: alert and oriented to time, place, person, and situation; cranial nerves 3-12 grossly intact  Head: normocephalic  Eyes: conjunctivae and sclerae clear  Ears: normal pinnae, normal external auditory canals, tympanic membranes intact  Nose: mucosa pink and noncongested, no masses, no mucopurulence, no polyps  Oral cavity / oropharynx: no mucosal lesions  Neck: soft, full range of motion, laryngotracheal complex palpable with appropriate landmarks, larynx elevates on swallowing  Indirect laryngoscopy: limited due to gag    Procedure  Rigid Laryngeal Videostroboscopy (00453): " Laryngeal videostroboscopy is indicated to assess the laryngeal vibratory biomechanics and vocal fold oscillation, which cannot be assessed with a plain light examination. This was carried out with a 70 degree endoscope. After verbal consent was obtained, the patient was positioned and the tongue was gently secured with a gauze sponge. The endoscope was passed transorally and positioned to image the larynx and hypopharynx in detail. The following features were examined: laryngeal and hypopharyngeal masses; vocal fold range and symmetry of motion; laryngeal mucosal edema, erythema, inflammation, and hydration; salivary pooling; and gross laryngeal sensation. During phonation, the vocal folds were assessed for glottal closure; mucosal wave; vocal fold lesions; vibratory periodicity, amplitude, and phase symmetry; and vertical height match. The equipment was removed. The patient tolerated the procedure well without complication. All findings were normal except:  - trace bilateral lamina propria edema  - linear closure pattern  - occasional dehydrated mucus  - very minimal erythema along the medial arytenoid      Assessment:     Ela Downs is a 25 y.o. female  with mild speaking-related muscle tension dysphonia. She has some very mild inflammatory changes in her posterior commissure which might be indicative of resolving granulation.    She has additional symptoms which may be indicative of reflux, as well as chronic rhinitis, presumably allergic.       Plan:        I had a discussion with the patient regarding her condition and the further workup and management options.      SLP voice evaluation and subsequent therapy sessions are medically necessary for restoration of laryngeal function. At her request, we will arrange for this to occur with one of our colleagues at the [facility].    I recommended she stop use of OTC nasal decongestant sprays and instead use a topical nasal steroid and nasal  saline.    I educated the patient on the impact of reflux on laryngopharyngeal disorders and provided suggestions on diet and lifestyle modifications that may help improve control of ongoing reflux. She may wish to consider a trial of Gaviscon Advance.    She will follow up with me in the future on an as-needed basis.    All questions were answered, and the patient is in agreement with the above.     Roni Ocampo M.D.  [facility]  Department of Otorhinolaryngology and Communication Sciences

## 2020-10-27 ENCOUNTER — TELEPHONE (OUTPATIENT)
Dept: SPEECH THERAPY | Facility: HOSPITAL | Age: 25
End: 2020-10-27

## 2020-11-02 ENCOUNTER — PATIENT MESSAGE (OUTPATIENT)
Dept: SPEECH THERAPY | Facility: HOSPITAL | Age: 25
End: 2020-11-02

## 2020-11-02 ENCOUNTER — CLINICAL SUPPORT (OUTPATIENT)
Dept: SPEECH THERAPY | Facility: HOSPITAL | Age: 25
End: 2020-11-02
Payer: COMMERCIAL

## 2020-11-02 DIAGNOSIS — R49.0 DYSPHONIA: ICD-10-CM

## 2020-11-02 DIAGNOSIS — R49.0 MUSCLE TENSION DYSPHONIA: ICD-10-CM

## 2020-11-02 DIAGNOSIS — J38.5 LARYNGEAL SPASM: ICD-10-CM

## 2020-11-02 PROCEDURE — 92524 BEHAVRAL QUALIT ANALYS VOICE: CPT | Performed by: SPEECH-LANGUAGE PATHOLOGIST

## 2020-11-02 NOTE — PROGRESS NOTES
"Adult Voice Evaluation  Date: 11/2/2020     Name: Ela Downs   MRN: [id number]    Therapy Diagnosis: No diagnosis found. Physician: Roni Ocampo MD  Physician Orders: SLP Voice Evaluation   Medical Diagnosis:     Visit #/ Visits Authorized:  1 / 1   Date of Evaluation:  11/2/2020  Insurance Authorization Period: 10/26/20-10/26/21  Plan of Care Certification:  05/02/21    Time In:  10:00 AM  Time Out:  11:00 AM    Procedure Min.   Qualitative Analysis of Voice and Resonance  60   Total Untimed Units: 4  Charges Billed/# of units: 71444    Precautions: Standard   Subjective      Current Medical History: Ela Downs is a 25 y.o. female referred for voice evaluation (CPT 67575) by Dr. Ocampo.  She presents with complaints of vocal tension and fatigue which began about a month ago.  The patient also reported the following complaints: voice worse in morning, fatigue with use and tightness. Patient is an . 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 Her classes are currently entirely online; however, she reports she plans to move to  in January 2021. She reports that over the last month, she has noticed a substantial amount of tension in her neck while talking however not while singing. She reports she has allergies and a post-nasal drip resulting in consistent throat clearing and a "scratchy" quality to her voice particularly at the end of a phrase. She also reports in the last month she experienced a sharp pain in her neck which she reports Dr. Ocampo suspects was secondary to swelling associated with acid reflux. With a two-week course of OTC Omeprazole, patient reports her pain has subsided and symptoms have resolved. Patient reports increased vocal fatigue with speaking which then results in semi-vocal norman quality of her voice.     Past Medical History:   Past Medical History:   Diagnosis Date    Kidney stone     Kidney stone 3/6/2018    PCOS " "(polycystic ovarian syndrome) 2015    Ela Downs  has a past surgical history that includes Kidney stone surgery and Lithotripsy.  Medical Hx and Allergies: Ela has a current medication list which includes the following prescription(s): alprazolam, ibuprofen, levonorgestrel-ethinyl estradiol, metformin, multivitamin, fish oil-omega-3 fatty acids, and tamsulosin.   Review of patient's allergies indicates:   Allergen Reactions    Latex, natural rubber Rash     Prior Therapy:  N/A  Pain:   0/10  Pain Location / Description: N/A  Nutrition: no restrictions   Social History: from [place] and grew up in [place]   Patient Therapy Goals: "get tips and tricks to not speak more free and with less tension"     Vocal Complaints: voice worse in morning, fatigue with use, tightness and intermittent reduced volume    Swallowing: N/A  Breathing: N/A  Smokin packs/day   EtOH: 2-3 drinks/month  Caffeine: 3 cups/day (coffee and diet coke)   Reflux: yes and OTC management  Water: 3-4 glasses/day     Vocal activities: daily singing (.5 hours; 1 day off/week); zoom classes and daily interacting     Environment: allergens changes in new house/environment     Laryngeal stroboscopy findings per Dr. Ocampo on 10/26/20:  Rigid Laryngeal Videostroboscopy (33951): Laryngeal videostroboscopy is indicated to assess the laryngeal vibratory biomechanics and vocal fold oscillation, which cannot be assessed with a plain light examination. This was carried out with a 70 degree endoscope. After verbal consent was obtained, the patient was positioned and the tongue was gently secured with a gauze sponge. The endoscope was passed transorally and positioned to image the larynx and hypopharynx in detail. The following features were examined: laryngeal and hypopharyngeal masses; vocal fold range and symmetry of motion; laryngeal mucosal edema, erythema, inflammation, and hydration; salivary pooling; and gross " laryngeal sensation. During phonation, the vocal folds were assessed for glottal closure; mucosal wave; vocal fold lesions; vibratory periodicity, amplitude, and phase symmetry; and vertical height match. The equipment was removed. The patient tolerated the procedure well without complication. All findings were normal except:  - trace bilateral lamina propria edema  - linear closure pattern  - occasional dehydrated mucus  - very minimal erythema along the medial arytenoid    Objective      Perceptual assessment:    -Quality: slight hoarse/vocal norman quality appreciated at the end of phrases  -Volume: appropriate for age and gender  -Pitch: low F0  -Flexibility: appropriate for age and gender    Consensus Auditory Perceptual Evaluation of Voice (CAPE-V) Overall Score: 30/100  Maximum Phonation Time (MPT) (ah > 18s WFL): 14.2 seconds  S/Z ratio (ratio of 1.4+ may indicate a degree of vocal fold dysfunction): 28 s/19.8s= 1.4    VHI-10 (completed to assess self-perceived handicap associated with dysphonia; >11 considered abnormal): 6/40  RSI (completed to assess the possible presence and/or severity of LPR symptoms and any relationship between this condition and the pt's dysphagia; score of ~15 may indicate LPR): 20/45    Oral-Motor Assessment  WNL    Muscle Tension Assessment  Tension Observed: jaw, neck and other(base of tongue)  Tenderness with palpation/massage: yes  Reduced thyrohyoid space at rest: yes    Breath Support  At rest: Mixed Abdominal/Thoracic  Sustained Phonation: Mixed Abdominal/Thoracic  Conversation: Mixed Abdominal/Thoracic  Speaks on residual air: no    Impact of Voice Impairment on Functioning: (0=no impact; 10=substantial impact)  Daily Activities: 2    Vocal Effort: 4  Vocal Fatigue: 5-6  Vocal Impact: 2    Education / Stimulability Trials   Discussed importance of vocal hygiene including: hydration, reducing caffeine / drying agents and reducing throat clearing, coughing, or other  phonotraumatic behaviors.  Patient was stimulable for improved voice using resonant/semi-occluded vocal tract exercises and circumlaryngeal massage.        OVERALL IMPRESSION:   Ela Downs is a 25 y.o. 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 Ocampo characterized by mild hoarseness/scratchy vocal quality, vocal fatigue, laryngeal, neck, and base of tongue tension, and tightness that began about a month ago resulting in significant vocal fatigue as well as impact on patient's vocal effort and daily activities. It is recommended patient participate in voice therapy to restore vocal function, to optimize glottal postures for improved vocal function, vocal efficiency, ease of phonation, to participate in voice education, and to implement a home exercise program.       DOC NOMS (National Outcome Measure System):  Voice  LEVEL 6: Voice sounds normal most of the time across all settings and situations. Selfmonitoring is consistent when needed. The individual¢s ability to participate in vocational, avocational, and social activities requiring voice is not affected in low vocal demand activities, but is rarely affected in high-vocal demand activities.     Treatment   Treatment Time In: n/a  Treatment Time Out: n/a      Education: Plan of Care, role of SLP in care, vocal hygeine and role of breathing and voice mechanisms to coordinate for speech and singing, exercises to reduce laryngeal tension and balance breathing and voice with reduced strain and tension on vocal folds were discussed with pt. Patient expressed understanding. All questions were answered.     Home Program: Patient encouraged to continue exercises as discussed in today's session to reduce laryngeal tension and strain placed on vocal folds as is associated with muscle tension dysphonia diagnosis in addition to implementation of acid reflux management and vocal hygiene home programs.      Assessment       Rehab Potential: excellent  Pt's spiritual, cultural and educational needs considered and pt agreeable to plan of care and goals.    Short Term Goals (3 months):   Patient will complete SOVT exercises and/or resonant-focused exercises 3-5x daily to strengthen and balance the intrinsic laryngeal musculature and maximize glottic closure without medial hyperfunction.  Patient will discriminate between easy and strained phonation with 80% accuracy.   Patient will identify the sensations associated with muscle relaxation in the abdominal, thoracic, neck and facial areas during efficient phonation with minimal clinician cue.   Patient will recall and utilize vocal hygiene strategies independently.   Patient will implement circumlaryngeal massage for 1-2 minutes every hour to reduce laryngeal tension with 80% accuracy and minimal cues.     Long Term Goals (6 months):   Patient and clinician will facilitate changes in vocal function in order to restore functional use of voice for daily occupational, social, and emotional demands.      Plan     Plan of Care Certification Period: 11/2/2020  to 05/02/21    Recommended Treatment Plan:  Patient will participate in the [facility] speech therapy every other week to address her  voice deficits, to optimize glottal postures for improved vocal function, vocal efficiency, ease of phonation, educate patient and their family, and to participate in a home exercise program.    Other Recommendations:   -Continue exercises as discussed in session  -Contact clinician with any further questions      Tara Oswald M.A. CF-SLP  Speech Language Pathologist  11/2/2020

## 2020-11-02 NOTE — PATIENT INSTRUCTIONS
Vocal Hygiene    Provide a moist environment for your voice:   o Drink lots of water  o Breathe in through your nose, not your mouth  o Use a humidifier to humidify the air, especially while sleeping  o Limit products that increase dryness (i.e., caffeine, alcohol, certain medications)   Eliminate behaviors that irritate your vocal folds:   o Smoking   o Habitual cough and throat clearing   o Acid reflux    Reduce vocal overuse and misuse:   o Overuse:   - Excessive talking and/or singing  o Misuse:   - Raising your voice  - Yelling, shouting, screaming  - Talking over background noise (i.e., restaurants, bars, talking on phone, radio or television)  - Whispering    Tips for staying hydrated:   o Drink water after drinking a caffeinated beverage (coffee, alcohol, soft drink)  o Drink more water than typical if:   - You are in a hot environment  - You are taking a medication that increases dryness  - You are doing physical activity  Alternatives to:    Habitual Coughing/Throat Clearing:   o Stay hydrated  o Sip water instead of clearing throat  o Suck on ice chips or hard candies  o Hard swallow    Raising Your Voice:   o Use nonverbal methods to get attention (clapping, whistle, moving hands/arms)  o Use an amplifier (like a microphone)  o Move away from noise and closer to your conversation partner   Excessive Voice Use:   o Take voice naps during the day to rest your voice, especially after a period of increase voice use  o Do not use your voice if you don't have to  o Use non-verbal communication when possible (gestures, email, texts, written messages, etc.)      Reflux Management Tips   Avoid eating right before bed. Allow 2-3 hours after eating before lying down at night.    Avoid overeating or becoming stuffed with food. Eat more small meals throughout the day rather than one or two large meals.    Take medication for GERD 30-45 minutes prior to eating a meal.    Elevate the head of the bed 6-8  inches.    Avoid too much of the following foods/beverages:   o Caffeine (coffee, tea, chocolate, soft drinks)   o Acidic foods (tomato or citrus based)  o Citrus juices (orange or lemon juice)  o Spicy foods   o Alcohol   o Carbonation   o Fried or fatty foods   Avoid heavy lifting, bending over, exercising after eating    Stop smoking       Circumlaryngeal Massage   *Adapted from Dominic GRANDE. Assessment and treatment of musculoskeletal tension in hyperfunctional voice disorders. Int J Speech Lang Pathol. 2008;10(4):195-209.    Goal: Muscles that are constantly contracted are tense, shortened, and stiffened and create a high placement of the larynx. Massage allows the muscles to relax and lengthen, helping to lower the larynx and therefore increase overall mobility of the larynx.      1. Using your fingers, locate the William's apple in the front of your neck and swallow. When you swallow, you should feel the William's apple move upward. This is your thyroid cartilage notch at the front of your larynx.   2. Gently using your fingers slide your thumb and forefinger (or thumb and middle finger) to the sides of your thyroid cartilage and feel for a small space. Your fingers should be slightly higher and back from the thyroid cartilage.    3. Move your fingers upward gently to find your hyoid bone.    4. Move your fingers slightly forward along the hyoid bone to join your fingers in the front of your throat, feeling the ligament space.    5. Gently apply pressure in a circular motion in the ligament space with a downward motion. This movement should continue for 1-2 minutes.   6. Gently apply pressure in a downward motion along the back edge of the thyroid to facilitate movement of the larynx downward for about 30 seconds.         **DO NOT perform this exercise if:   -you have had a thyroplasty (any cut to the thyroid cartilage)   -do not apply pressure to the carotid artery      Titze's Semi-Occluded Vocal Tract  Exercises   Elbert Cardoza, Ph.D.  Goal: To use appropriate tone, focus, and breath support without tensing the muscles of the mouth and throat or hurting the vocal folds which is achieved by creating a small mouth opening while producing voice. With an open mouth, there is a high vibrational amplitude of the VFs, but with a semi-occluded mouth, there is roughly half the vibrational amplitude of the VFs.    Small mouth openings with greatest effect/most artificialàleast effect/most natural:   1. Highly resistance stirring straw (coffee stirrer)   2. Less resistant drinking straw  3. Bilateral or labiodental voiced fricative (lip buzz or vvv sound)  4. Lip or tongue trill   5. Nasal consonants   6. Vowels eee and ooo    Exercises:   1. Sustained pitch. Hold a comfortable pitch as long as you are able to.     2. Pitch glides. Glide from lowest to highest pitch and back down to the lowest pitch.     3. Buzz a song. Hum a song through your small mouth opening (#1-6 listed above), such as Happy Birthday or Terra Had a Little Weber.     4. Buzz a sentence. Hum a sentence through you small mouth opening (#1-6 listed above), such Hi, my name is ____ or How are you doing?    **Relax, breathe easily in through your nose  **Focus on the vibrations in your lips, nose, and the front of your face

## 2020-11-09 ENCOUNTER — CLINICAL SUPPORT (OUTPATIENT)
Dept: SPEECH THERAPY | Facility: HOSPITAL | Age: 25
End: 2020-11-09
Payer: COMMERCIAL

## 2020-11-09 DIAGNOSIS — R49.0 DYSPHONIA: Primary | ICD-10-CM

## 2020-11-09 PROCEDURE — 92507 TX SP LANG VOICE COMM INDIV: CPT | Performed by: SPEECH-LANGUAGE PATHOLOGIST

## 2020-11-09 NOTE — PROGRESS NOTES
"Speech and Language Therapy Treatment Note  Date:  11/9/2020     Name: Ela Downs   MRN: [id number]   Therapy Diagnosis: Dysphonia Physician: Roni Ocampo MD.   Physician Orders: Ambulatory referral/consult to speech therapy   Medical Diagnosis: Dysphonia; Laryngeal Spasm; Muscle Tension Dysphonia     Visit #/Visits authorized: 1/ 20  Date of Evaluation: 11/2/20  Insurance Authorization Period: 11/9/20-12/31/20  Plan of Care Expiration Date: 05/02/21  Extended POC: N/A    Time In:  10:00 AM  Time Out:  11:00 AM  Total Billable Time: 60 mins     Precautions: Standard    Subjective:   Pt reports: "doing okay" everything has been "pretty good". She reports she feels she was doing laryngeal massage too often as she was doing it about every hour; however, she adjusted to completing the massage about 3 times per day and is reports that she feels it is helping to reduce tension.     She  was compliant to home exercise program.     Pain Scale:  0/10 on VAS currently.   Pain Location: N/A  Objective:   TIMED  Procedure Min.   N/A    0         UNTIMED  Procedure Min.   Speech- Language- Voice Therapy  60   Total Timed Units: 0  Total Untimed Units: 4  Charges Billed/# of units: 83112/4    Short Term Goals: (3 months) Current Progress:   Patient will complete SOVT exercises and/or resonant-focused exercises 3-5x daily to strengthen and balance the intrinsic laryngeal musculature and maximize glottic closure without medial hyperfunction. Patient completed SOVT exercises (less resistant straw, lip trills, /v/ sound, nasal resonant sounds, and /u/ sound for sustained pitch, gliding pitch, a song, and in functional reading tasks with minimal-moderate cueing and 90% accuracy. Patient encouraged to complete exercises at least 3 times daily.     Patient will discriminate between easy and strained phonation with 80% accuracy.  Patient differentiated between easy and strained phonation with 70% accuracy and sustained phonation " "and conversation tasks. Patient also able to differentiate between strained phonation following SOVT exercises independently.    Patient will identify the sensations associated with muscle relaxation in the abdominal, thoracic, neck and facial areas during efficient phonation with minimal clinician cue. Patient differentiated between associations of muscle tension and muscle relaxation in her face and neck while completing SOVT exercises as well as in informal conversation and reading tasks in today's session.    Patient will recall and utilize vocal hygiene strategies independently.  Patient recalled and reported implementing vocal hygiene strategies (ie. Elimination of phonotraumatic behaviors, increased water intake to hydrate vocal folds, etc.) independently.    Patient will implement circumlaryngeal massage for 1-2 minutes every hour to reduce laryngeal tension with 80% accuracy and minimal cues.  Patient implemented circumlaryngeal massage at first several times an hour; however, she reported increased tightness/dull pain/irrirtation with this frequency. She reports she has reduced frequency of exercises implemented and has noticed a positive change in amount of laryngeal tension without tightness or irritation.    Patient will implement straw phonation with functional tasks at least once per hour to reduce laryngeal tension and maximize glottic closure.  Patient reports she implemented straw phonation not only as a warm-up for her vocal exercises but also to reduce laryngeal tension in singing and functional tasks (ie. Talking). Patient reports she completed these exercises at the beginning of the day to "warm up" and get her voice ready for the day. Patient reports she will continue to implement with functional tasks in daily tasks.        Patient Education/Response:   Patient educated regarding nature of muscle tension dysphonia as it relates to patient's vocal use for singing as well as for functional " tasks. Also of note, patient appeared to speak on residual air for speaking/reading tasks only; however, used well balanced voice and breathing for singing tasks. These functional speaking tasks are the only tasks where hoarseness/glottal norman-quality was observed and corresponds with patient's reported complaints. Patient educated regarding balancing breathing and speech as well as was provided exercises to facilitate retraining of balanced breathing for speech tasks. Patient also educated regarding nature of SOVT exercises to balance laryngeal musculature with returned demonstration of exercises as well as rationale for implementing exercises.      Written Home Exercises Provided: Patient instructed to cont prior HEP.  Exercises were reviewed and Ela was able to demonstrate them prior to the end of the session.  Ela demonstrated good  understanding of the education provided.     See EMR under Media for exercises provided prior visit.  Assessment:   Ela is progressing well towards her goals. Current goals remain appropriate. Goals to be updated as necessary.     Pt prognosis is Excellent. Pt will continue to benefit from skilled outpatient speech and language therapy to address the deficits listed in the problem list on initial evaluation, provide pt/family education and to maximize pt's level of independence in the home and community environment.     Barriers to Therapy:   Pt's spiritual, cultural and educational needs considered and pt agreeable to plan of care and goals.    Plan:   Continue POC with focus on addressing voice deficits, optimizing glottal postures for improved vocal function, vocal efficiency, ease of phonation, educate patient and their family, and to participate in a home exercise program.    Tara Oswald M.A. CF-SLP  Speech Language Pathologist  11/9/2020

## 2020-11-23 ENCOUNTER — PATIENT MESSAGE (OUTPATIENT)
Dept: SPEECH THERAPY | Facility: HOSPITAL | Age: 25
End: 2020-11-23

## 2020-11-30 ENCOUNTER — CLINICAL SUPPORT (OUTPATIENT)
Dept: SPEECH THERAPY | Facility: HOSPITAL | Age: 25
End: 2020-11-30
Payer: COMMERCIAL

## 2020-11-30 DIAGNOSIS — R49.0 DYSPHONIA: Primary | ICD-10-CM

## 2020-11-30 DIAGNOSIS — R49.0 MUSCLE TENSION DYSPHONIA: ICD-10-CM

## 2020-11-30 PROCEDURE — 92507 TX SP LANG VOICE COMM INDIV: CPT | Performed by: SPEECH-LANGUAGE PATHOLOGIST

## 2020-11-30 NOTE — PROGRESS NOTES
"Speech and Language Therapy Treatment/Discharge Note  Date:  11/30/2020     Name: Ela Downs   MRN: [id number]   Therapy Diagnosis: Dysphonia Physician: Roni Ocampo MD.   Physician Orders: Ambulatory referral/consult to speech therapy   Medical Diagnosis: Dysphonia; Laryngeal Spasm; Muscle Tension Dysphonia     Visit #/Visits authorized: 2/ 20  Date of Evaluation: 11/2/20  Discharge Date: 11/30/2020   Insurance Authorization Period: 11/9/20-12/31/20  Plan of Care Expiration Date: 05/02/21  Extended POC: N/A    Date of Last visit: 11/30/2020   Total Visits Received: 3  Cancelled Visits: 0  No Show Visits: 0    Time In:  10:00 AM  Time Out:  11:00 AM  Total Billable Time: 60 mins     Precautions: Standard    Subjective:   Pt reports: she is doing well and everything has been "good". She reports that her voice has been "really good" and she feels like she has progressed to the point of experiencing little to no laryngeal tension while speaking. She reports she has been using straw phonation exercises to warm up her voice as well as has been following SOVT protocol each day. She reports she feels her increased awareness of her speaking voice has been beneficial to making adjustments to better balance breathing for speech.     She  was compliant to home exercise program.     Pain Scale:  0/10 on VAS currently.   Pain Location: N/A  Objective:   TIMED  Procedure Min.   N/A    0         UNTIMED  Procedure Min.   Speech- Language- Voice Therapy  60   Total Timed Units: 0  Total Untimed Units: 4  Charges Billed/# of units: 60498/4    Short Term Goals: (3 months) Current Progress:   Patient will complete SOVT exercises and/or resonant-focused exercises 3-5x daily to strengthen and balance the intrinsic laryngeal musculature and maximize glottic closure without medial hyperfunction. Patient completed SOVT exercises (less resistant straw, lip trills, /v/ sound, nasal resonant sounds, and /u/ sound for sustained pitch, " gliding pitch, a song, and in functional reading tasks with minimal cueing and 90% accuracy. Of note, patient reports focusing on balancing breathing (I.e. not stacking air and taking in adequate amount of breath for phrase) while completing functional sentences portion of exercises. She reports she has been implementing functional tasks (I.e. reading her emails aloud in the morning) to practice this portion of the exercise. Patient encouraged to complete exercises as needed when she is experiencing laryngeal tension while speaking or singing.      Patient will discriminate between easy and strained phonation with 80% accuracy.  Patient differentiated between easy and strained phonation with 90% accuracy and sustained phonation and conversation tasks. Patient also able to differentiate between strained phonation following SOVT exercises independently.      Patient will identify the sensations associated with muscle relaxation in the abdominal, thoracic, neck and facial areas during efficient phonation with minimal clinician cue. Patient differentiated between associations of muscle tension and muscle relaxation in her face and neck while completing SOVT exercises as well as in informal conversation and reading tasks in today's session independently.      Patient will recall and utilize vocal hygiene strategies independently.  Patient recalled and reported implementing vocal hygiene strategies (ie. Elimination of phonotraumatic behaviors, increased water intake to hydrate vocal folds, etc.) independently.      Patient will implement circumlaryngeal massage for 1-2 minutes every hour to reduce laryngeal tension with 80% accuracy and minimal cues.  Patient implemented circumlaryngeal massage as needed to reduce laryngeal tension and strain independently.    Patient will implement straw phonation with functional tasks as needed to reduce laryngeal tension and maximize glottic closure.  Patient reports she implemented  "straw phonation not only as a warm-up for her vocal exercises but also to reduce laryngeal tension in singing and functional tasks (ie. Talking). Patient reports she completed these exercises at the beginning of the day to "warm up" and get her voice "ready" for the day. Patient reports she will continue to implement with functional tasks in daily tasks.          Patient Education/Response:   Patient educated regarding nature of muscle tension dysphonia as it relates to patient's vocal use for singing as well as for functional tasks. Also of note, patient appeared to speak on residual air for speaking/reading tasks only; however, used well balanced voice and breathing for singing tasks. These functional speaking tasks are the only tasks where hoarseness/glottal norman-quality was observed and corresponds with patient's reported complaints. Patient educated regarding balancing breathing and speech as well as was provided exercises to facilitate retraining of balanced breathing for speech tasks. Patient also educated regarding nature of SOVT exercises to balance laryngeal musculature with returned demonstration of exercises as well as rationale for implementing exercises.      Written Home Exercises Provided: Patient instructed to cont prior HEP.  Exercises were reviewed and Ela was able to demonstrate them prior to the end of the session.  Ela demonstrated good  understanding of the education provided.     See EMR under Media for exercises provided prior visit.  Assessment:     Assessment of current status: Patient has demonstrated significant progress in increasing awareness of voice deficit, increasing knowledge of SOVT as well as rationale for exercises and independent implementation of exercises and strategies to reduce laryngeal tension and balance breathing for speech and singing. Patient is now asymptomatic and produces voice for speech and singing without laryngeal tension.     Discharge reason: Patient " is now asymptomatic and patient has met all of her goals.     Plan:   Patient encouraged to continue home exercise program as well as follow up should her voice symptoms change or progress. This patient is discharged from Speech Therapy.     Tara Oswald M.A. CF-SLP  Speech Language Pathologist  11/30/2020

## 2020-12-29 ENCOUNTER — PATIENT MESSAGE (OUTPATIENT)
Dept: INTERNAL MEDICINE | Facility: CLINIC | Age: 25
End: 2020-12-29

## 2020-12-29 ENCOUNTER — OFFICE VISIT (OUTPATIENT)
Dept: INTERNAL MEDICINE | Facility: CLINIC | Age: 25
End: 2020-12-29
Payer: COMMERCIAL

## 2020-12-29 ENCOUNTER — TELEPHONE (OUTPATIENT)
Dept: INTERNAL MEDICINE | Facility: CLINIC | Age: 25
End: 2020-12-29

## 2020-12-29 DIAGNOSIS — K76.9 LIVER DISEASE, UNSPECIFIED: ICD-10-CM

## 2020-12-29 DIAGNOSIS — R16.0 LIVER MASS, RIGHT LOBE: ICD-10-CM

## 2020-12-29 DIAGNOSIS — J03.90 TONSILLITIS: Primary | ICD-10-CM

## 2020-12-29 PROCEDURE — 99213 OFFICE O/P EST LOW 20 MIN: CPT | Mod: 95,,, | Performed by: FAMILY MEDICINE

## 2020-12-29 PROCEDURE — 99213 PR OFFICE/OUTPT VISIT, EST, LEVL III, 20-29 MIN: ICD-10-PCS | Mod: 95,,, | Performed by: FAMILY MEDICINE

## 2020-12-29 RX ORDER — AMOXICILLIN 875 MG/1
875 TABLET, FILM COATED ORAL EVERY 12 HOURS
Qty: 20 TABLET | Refills: 0 | Status: SHIPPED | OUTPATIENT
Start: 2020-12-29

## 2020-12-29 NOTE — PROGRESS NOTES
The patient location is: [place]   The chief complaint leading to consultation is: sore throat    Visit type: audiovisual    Face to Face time with patient: 12 minutes  14 minutes of total time spent on the encounter, which includes face to face time and non-face to face time preparing to see the patient (eg, review of tests), Obtaining and/or reviewing separately obtained history, Documenting clinical information in the electronic or other health record, Independently interpreting results (not separately reported) and communicating results to the patient/family/caregiver, or Care coordination (not separately reported).         Each patient to whom he or she provides medical services by telemedicine is:  (1) informed of the relationship between the physician and patient and the respective role of any other health care provider with respect to management of the patient; and (2) notified that he or she may decline to receive medical services by telemedicine and may withdraw from such care at any time.    Notes:     Subjective:      Patient ID: Ela Downs is a 25 y.o. female.    Chief Complaint: No chief complaint on file.      Patient reports 2 days ago she woke up with sudden onset of sore throat.  She had had a headache the day prior with neck pain as well.  She reports seeing white spots over her tonsils when her throat began to hurt, has checked again today and it seems that that has worsened.  On review of chart, patient went to ER in February 2020, had MRI with incidental finding large right lobe liver lobe mass, about 12 x 8 cm.  Patient reports this has never been worked up.    Sore Throat   This is a new problem. The current episode started in the past 7 days. The problem has been waxing and waning. The pain is worse on the right side. There has been no fever. The pain is at a severity of 7/10. The pain is moderate. Associated symptoms include congestion, headaches, a plugged ear sensation, neck pain  and swollen glands. Pertinent negatives include no abdominal pain, coughing, diarrhea, drooling, ear discharge, ear pain, hoarse voice, shortness of breath, stridor, trouble swallowing or vomiting. She has had no exposure to strep or mono. She has tried acetaminophen for the symptoms. The treatment provided mild relief.     Review of Systems   HENT: Positive for congestion and sore throat. Negative for drooling, ear discharge, ear pain, hoarse voice and trouble swallowing.    Respiratory: Negative for cough, shortness of breath and stridor.    Gastrointestinal: Negative for abdominal pain, diarrhea and vomiting.   Musculoskeletal: Positive for neck pain.   Neurological: Positive for headaches.     Past Medical History:   Diagnosis Date    Kidney stone     Kidney stone 3/6/2018    PCOS (polycystic ovarian syndrome) 12/21/2015          Past Surgical History:   Procedure Laterality Date    KIDNEY STONE SURGERY      LITHOTRIPSY       Family History   Problem Relation Age of Onset    Diabetes Paternal Grandmother     Heart disease Paternal Grandmother     Diabetes Paternal Grandfather     Diabetes Maternal Grandmother     Heart disease Maternal Grandmother     Diabetes Maternal Grandfather     Heart disease Maternal Grandfather     Colon cancer Neg Hx     Esophageal cancer Neg Hx      Social History     Socioeconomic History    Marital status: Single     Spouse name: Not on file    Number of children: Not on file    Years of education: Not on file    Highest education level: Not on file   Occupational History    Not on file   Social Needs    Financial resource strain: Not on file    Food insecurity     Worry: Not on file     Inability: Not on file    Transportation needs     Medical: Not on file     Non-medical: Not on file   Tobacco Use    Smoking status: Never Smoker    Smokeless tobacco: Never Used   Substance and Sexual Activity    Alcohol use: Not on file     Comment: once monthly    Drug  use: No    Sexual activity: Not on file   Lifestyle    Physical activity     Days per week: Not on file     Minutes per session: Not on file    Stress: Not on file   Relationships    Social connections     Talks on phone: Not on file     Gets together: Not on file     Attends Baptism service: Not on file     Active member of club or organization: Not on file     Attends meetings of clubs or organizations: Not on file     Relationship status: Not on file   Other Topics Concern    Not on file   Social History Narrative    Not on file     Review of patient's allergies indicates:   Allergen Reactions    Latex, natural rubber Rash       Objective:       There were no vitals taken for this visit.  Physical Exam  Constitutional:       General: She is not in acute distress.     Appearance: Normal appearance. She is well-developed. She is not ill-appearing or diaphoretic.   HENT:      Mouth/Throat:      Comments: Patient has attached picture significantly enlarged tonsils, large amount of pus  Pulmonary:      Effort: No respiratory distress.   Neurological:      Mental Status: She is alert and oriented to person, place, and time.   Psychiatric:         Mood and Affect: Mood normal.         Behavior: Behavior normal.         Thought Content: Thought content normal.         Judgment: Judgment normal.       Assessment:     1. Tonsillitis    2. Liver mass, right lobe    3. Liver disease, unspecified      Plan:   Tonsillitis    Liver mass, right lobe  -     Basic Metabolic Panel; Future; Expected date: 12/29/2020    Liver disease, unspecified  -     MRI Abdomen W WO Contrast; Future; Expected date: 12/29/2020    Other orders  -     amoxicillin (AMOXIL) 875 MG tablet; Take 1 tablet (875 mg total) by mouth every 12 (twelve) hours.  Dispense: 20 tablet; Refill: 0     Patient to contact me if sore throat persists, consider mono spot if needed  Medication List with Changes/Refills   New Medications    AMOXICILLIN (AMOXIL) 875  MG TABLET    Take 1 tablet (875 mg total) by mouth every 12 (twelve) hours.   Current Medications    ALPRAZOLAM (XANAX) 0.25 MG TABLET    Take 0.25 mg by mouth.    IBUPROFEN (ADVIL,MOTRIN) 800 MG TABLET    Take 1 tablet (800 mg total) by mouth every 8 (eight) hours as needed for Pain.    LEVONORGESTREL-ETHINYL ESTRADIOL (SEASONALE) 0.15 MG-30 MCG PER TABLET    Take 1 tablet by mouth once daily.    METFORMIN (GLUCOPHAGE) 500 MG TABLET    TAKE 1 TAB TWICE DAILY    MULTIVITAMIN CAPSULE    Take 1 capsule by mouth once daily.    OMEGA-3 FATTY ACIDS/FISH OIL (FISH OIL-OMEGA-3 FATTY ACIDS) 300-1,000 MG CAPSULE    Take by mouth once daily.    TAMSULOSIN (FLOMAX) 0.4 MG CAP    Take 1 capsule (0.4 mg total) by mouth once daily.

## 2020-12-29 NOTE — TELEPHONE ENCOUNTER
----- Message from Bang Mujica MD sent at 12/29/2020  2:50 PM CST -----  Patient needs MRI of abdomen scheduled for liver mass, have her get BMP prior to MRI

## 2021-04-29 ENCOUNTER — PATIENT MESSAGE (OUTPATIENT)
Dept: RESEARCH | Facility: HOSPITAL | Age: 26
End: 2021-04-29